# Patient Record
Sex: FEMALE | Race: WHITE
[De-identification: names, ages, dates, MRNs, and addresses within clinical notes are randomized per-mention and may not be internally consistent; named-entity substitution may affect disease eponyms.]

---

## 2017-10-27 ENCOUNTER — HOSPITAL ENCOUNTER (OUTPATIENT)
Dept: HOSPITAL 76 - LAB.R | Age: 32
Discharge: HOME | End: 2017-10-27
Attending: FAMILY MEDICINE
Payer: COMMERCIAL

## 2017-10-27 DIAGNOSIS — N89.8: Primary | ICD-10-CM

## 2017-10-27 DIAGNOSIS — R10.2: ICD-10-CM

## 2017-10-27 PROCEDURE — 87510 GARDNER VAG DNA DIR PROBE: CPT

## 2017-10-27 PROCEDURE — 87480 CANDIDA DNA DIR PROBE: CPT

## 2017-10-27 PROCEDURE — 87660 TRICHOMONAS VAGIN DIR PROBE: CPT

## 2017-10-27 PROCEDURE — 87086 URINE CULTURE/COLONY COUNT: CPT

## 2017-10-30 ENCOUNTER — HOSPITAL ENCOUNTER (OUTPATIENT)
Dept: HOSPITAL 76 - DI | Age: 32
Discharge: HOME | End: 2017-10-30
Attending: FAMILY MEDICINE
Payer: COMMERCIAL

## 2017-10-30 DIAGNOSIS — R10.2: Primary | ICD-10-CM

## 2017-10-30 DIAGNOSIS — Z97.5: ICD-10-CM

## 2017-10-30 PROCEDURE — 76830 TRANSVAGINAL US NON-OB: CPT

## 2017-10-30 PROCEDURE — 76856 US EXAM PELVIC COMPLETE: CPT

## 2017-11-01 NOTE — ULTRASOUND REPORT
PELVIC ULTRASOUND:  10/30/2017 

 

CLINICAL INDICATION:  Pelvic pain. 

 

COMPARISON:  04/21/2015  

 

TECHNIQUE:  Transabdominal pelvic ultrasound performed for global evaluation.  
Transvaginal pelvic ultrasound performed for detailed evaluation.  Real-time 
scanning performed and static images obtained. 

 

FINDINGS:  The uterus is anteverted, measuring 8.5 x 4.6 x 4.2 cm.  The 
endometrial echo complex measures 6 mm.  An IUD is noted within the endometrial 
canal.  No focal myometrial lesion is seen.  The right ovary measures 4.2 x 2.8 
x 2.3 cm.  Normal flow is seen.  The left ovary measures 2.6 x 2.0 x 1.7 cm.  
There is either a mildly dilated left fallopian tube interposed between the 
uterus and left ovary or a left parovarian cyst, which measures 2.8 x 2.7 x 1.5 
cm.  Normal flow is seen to the left ovary.  No free fluid is present. 

 

IMPRESSION:  

1.  IUD IN THE EXPECTED LOCATION.

 

2.  LEFT PAROVARIAN CYST VERSUS MILDLY DILATED FALLOPIAN TUBE.

 

3.  RESOLUTION OF PREVIOUSLY SEEN HEMORRHAGIC RIGHT OVARIAN CYST. 

 

 

 

DD:11/01/2017 9:04:50  DT: 11/01/2017 09:16  JOB #: [id number]  EXT JOB #:
[id number]

[facility]

## 2018-04-21 ENCOUNTER — HOSPITAL ENCOUNTER (OUTPATIENT)
Dept: HOSPITAL 76 - ED | Age: 33
Setting detail: OBSERVATION
LOS: 1 days | Discharge: HOME | End: 2018-04-22
Attending: INTERNAL MEDICINE | Admitting: INTERNAL MEDICINE
Payer: COMMERCIAL

## 2018-04-21 DIAGNOSIS — Z79.3: ICD-10-CM

## 2018-04-21 DIAGNOSIS — Z79.899: ICD-10-CM

## 2018-04-21 DIAGNOSIS — F32.9: ICD-10-CM

## 2018-04-21 DIAGNOSIS — J98.11: ICD-10-CM

## 2018-04-21 DIAGNOSIS — I26.99: Primary | ICD-10-CM

## 2018-04-21 LAB
ALBUMIN DIAFP-MCNC: 3.6 G/DL (ref 3.2–5.5)
ALBUMIN/GLOB SERPL: 0.8 {RATIO} (ref 1–2.2)
ALP SERPL-CCNC: 45 IU/L (ref 42–121)
ALT SERPL W P-5'-P-CCNC: < 10 IU/L (ref 10–60)
ANION GAP SERPL CALCULATED.4IONS-SCNC: 9 MMOL/L (ref 6–13)
AST SERPL W P-5'-P-CCNC: 13 IU/L (ref 10–42)
BASOPHILS NFR BLD AUTO: 0 10^3/UL (ref 0–0.1)
BASOPHILS NFR BLD AUTO: 0.3 %
BILIRUB BLD-MCNC: < 0.2 MG/DL (ref 0.2–1)
BUN SERPL-MCNC: 9 MG/DL (ref 6–20)
CALCIUM UR-MCNC: 8.7 MG/DL (ref 8.5–10.3)
CHLORIDE SERPL-SCNC: 99 MMOL/L (ref 101–111)
CLARITY UR REFRACT.AUTO: CLEAR
CO2 SERPL-SCNC: 26 MMOL/L (ref 21–32)
CREAT SERPLBLD-SCNC: 0.6 MG/DL (ref 0.4–1)
EOSINOPHIL # BLD AUTO: 0 10^3/UL (ref 0–0.7)
EOSINOPHIL NFR BLD AUTO: 0.1 %
ERYTHROCYTE [DISTWIDTH] IN BLOOD BY AUTOMATED COUNT: 14.3 % (ref 12–15)
GFRSERPLBLD MDRD-ARVRAT: 117 ML/MIN/{1.73_M2} (ref 89–?)
GLOBULIN SER-MCNC: 4.3 G/DL (ref 2.1–4.2)
GLUCOSE SERPL-MCNC: 95 MG/DL (ref 70–100)
GLUCOSE UR QL STRIP.AUTO: NEGATIVE MG/DL
HCG UR QL: NEGATIVE
HGB UR QL STRIP: 12 G/DL (ref 12–16)
INR PPP: 1.1 (ref 0.8–1.2)
KETONES UR QL STRIP.AUTO: NEGATIVE MG/DL
LYMPHOCYTES # SPEC AUTO: 1.7 10^3/UL (ref 1.5–3.5)
LYMPHOCYTES NFR BLD AUTO: 17 %
MAGNESIUM SERPL-MCNC: 2 MG/DL (ref 1.7–2.8)
MCH RBC QN AUTO: 26.3 PG (ref 27–31)
MCHC RBC AUTO-ENTMCNC: 32.1 G/DL (ref 32–36)
MCV RBC AUTO: 81.9 FL (ref 81–99)
MONOCYTES # BLD AUTO: 0.9 10^3/UL (ref 0–1)
MONOCYTES NFR BLD AUTO: 8.9 %
NEUTROPHILS # BLD AUTO: 7.4 10^3/UL (ref 1.5–6.6)
NEUTROPHILS # SNV AUTO: 10 X10^3/UL (ref 4.8–10.8)
NEUTROPHILS NFR BLD AUTO: 73.7 %
NITRITE UR QL STRIP.AUTO: NEGATIVE
PDW BLD AUTO: 7.6 FL (ref 7.9–10.8)
PH UR STRIP.AUTO: 5.5 PH (ref 5–7.5)
PHOSPHATE BLD-MCNC: 2.9 MG/DL (ref 2.5–4.6)
PLATELET # BLD: 295 10^3/UL (ref 130–450)
PROT SPEC-MCNC: 7.9 G/DL (ref 6.7–8.2)
PROT UR STRIP.AUTO-MCNC: NEGATIVE MG/DL
PROTHROM ACT/NOR PPP: 12.7 SECS (ref 9.9–12.6)
RBC # UR STRIP.AUTO: NEGATIVE /UL
RBC MAR: 4.55 10^6/UL (ref 4.2–5.4)
SODIUM SERPLBLD-SCNC: 134 MMOL/L (ref 135–145)
SP GR UR STRIP.AUTO: 1.01 (ref 1–1.03)
UROBILINOGEN UR QL STRIP.AUTO: (no result) E.U./DL
UROBILINOGEN UR STRIP.AUTO-MCNC: NEGATIVE MG/DL

## 2018-04-21 PROCEDURE — 81003 URINALYSIS AUTO W/O SCOPE: CPT

## 2018-04-21 PROCEDURE — 80053 COMPREHEN METABOLIC PANEL: CPT

## 2018-04-21 PROCEDURE — 85025 COMPLETE CBC W/AUTO DIFF WBC: CPT

## 2018-04-21 PROCEDURE — 36415 COLL VENOUS BLD VENIPUNCTURE: CPT

## 2018-04-21 PROCEDURE — 83690 ASSAY OF LIPASE: CPT

## 2018-04-21 PROCEDURE — 96372 THER/PROPH/DIAG INJ SC/IM: CPT

## 2018-04-21 PROCEDURE — 71275 CT ANGIOGRAPHY CHEST: CPT

## 2018-04-21 PROCEDURE — 76705 ECHO EXAM OF ABDOMEN: CPT

## 2018-04-21 PROCEDURE — 81001 URINALYSIS AUTO W/SCOPE: CPT

## 2018-04-21 PROCEDURE — 83735 ASSAY OF MAGNESIUM: CPT

## 2018-04-21 PROCEDURE — 96375 TX/PRO/DX INJ NEW DRUG ADDON: CPT

## 2018-04-21 PROCEDURE — 85610 PROTHROMBIN TIME: CPT

## 2018-04-21 PROCEDURE — 82150 ASSAY OF AMYLASE: CPT

## 2018-04-21 PROCEDURE — 99283 EMERGENCY DEPT VISIT LOW MDM: CPT

## 2018-04-21 PROCEDURE — 85379 FIBRIN DEGRADATION QUANT: CPT

## 2018-04-21 PROCEDURE — 93005 ELECTROCARDIOGRAM TRACING: CPT

## 2018-04-21 PROCEDURE — 81025 URINE PREGNANCY TEST: CPT

## 2018-04-21 PROCEDURE — 71046 X-RAY EXAM CHEST 2 VIEWS: CPT

## 2018-04-21 PROCEDURE — 99285 EMERGENCY DEPT VISIT HI MDM: CPT

## 2018-04-21 PROCEDURE — 99218: CPT

## 2018-04-21 PROCEDURE — 87086 URINE CULTURE/COLONY COUNT: CPT

## 2018-04-21 PROCEDURE — 96374 THER/PROPH/DIAG INJ IV PUSH: CPT

## 2018-04-21 PROCEDURE — 84100 ASSAY OF PHOSPHORUS: CPT

## 2018-04-21 RX ADMIN — OXYCODONE PRN MG: 5 TABLET ORAL at 22:02

## 2018-04-21 RX ADMIN — SODIUM CHLORIDE, PRESERVATIVE FREE SCH ML: 5 INJECTION INTRAVENOUS at 23:24

## 2018-04-21 NOTE — HISTORY & PHYSICAL EXAMINATION
Chief Complaint





- Chief Complaint


Chief Complaint: Chest pain





History of Present Illness





- Admitted From


Admitted From:: Emergency department





- History Obtained From


Records Reviewed: Yes


History obtained from: Patient


Exam Limitations: None





- History of Present Illness


HPI Comment/Other: 


Patient is a 32-year-old female with a past medical history significant for 

depression who presented to the emergency department with a chief complaint of 

right-sided chest pain.  She states that the chest pain started exactly 1 week 

ago.  She states that it started with right-sided pain when she woke up in the 

morning.  She thought that she had just slept on her side incorrectly.  However 

the pain continued for the next 2 days.  She states that the pain was much 

worse when she took a deep breath.  She states that she was not short of breath 

but because breathing hurt she did not want to take a breath.  She states that 

after 2 days the pain moved from the right side of the chest to the left side 

of the chest.  She states that the pain then continued in the left side for 2 

days and then went back to the right side.  She states that the pain is located 

all over the right chest but in around the lower rib cage.  She states that is 

worse with a deep breath.  She states it does not radiate to her left arm or 

jaw.  She states that the only thing that makes it better is just staying 

still.  She states that over the last 2 days the pain has been there all the 

time not just with deep breathing.  She states that even lying down flat was 

making the pain worse.  She was concerned that she must have infection and she 

went to urgent care today and was sent to the emergency department for a right 

upper quadrant ultrasound.  Of note the patient states that she previously used 

an IUD for birth control but that caused multiple cyst to rupture therefore she 

was switched to an oral birth control pill just 1 month ago.  She denies 

smoking. The patient denies any recent travel.  She denies any recent long car 

rides.  She denies any recent plane travel.  She denies any family history of 

DVTs or PEs.





The patient denies any fevers, chills, headaches, blurred vision, runny nose, 

sore throat, nasal congestion, difficulty swallowing, orthopnea, PND, lower 

extremity swelling, abdominal pain, nausea, vomiting, diarrhea, constipation, 

dysuria, increased urinary frequency, urinary urgency, back pain, joint pain, 

muscle aches, joint swelling, neck stiffness, recent unintentional weight loss, 

night sweats, changes in appetite, hair loss, skin rashes or any focal 

neurologic deficits





On presentation to the emergency department the patient was afebrile heart rate 

was 95 blood pressure was stable and she was not in any acute respiratory 

distress saturating well on room air.  The patient's lab work revealed sodium 

of 134 otherwise all her electrolytes were within normal limits.  The patient's 

CBC was all within normal limits and her UA was negative.  The patient 

underwent a EKG which showed normal sinus rhythm at 96.  The patient underwent 

an ultrasound of her abdomen which revealed no acute changes.  The patient then 

underwent a CT angiogram of her thorax which showed acute pulmonary embolism 

within branch vessel supplying the right greater than the left lobes without 

right heart strain.  She also had patchy consolidation within the right lung 

base which likely represented pulmonary infarction coupled with atelectasis.  

The patient continued to have severe pain and required morphine in the 

emergency department.  The patient was placed in observation overnight for 

control of her chest pain and to start treatment for pulmonary embolism.  In 

the emergency department the patient did receive Lovenox subQ.





History





- Past Medical History


Psych: reports: Depression


MRSA Hx?: No





- Past Surgical History


HEENT: reports: Tonsil/Adenoidectomy





- Family & Social History


Family History: Mother: Alive and Well, Hyperlipidemia (Maternal grandfather), 

Father: Alive and Well, Other family: Hyperlipidemia


Living arrangement: At home


Living Situation: With spouse/s.o.


Social History Notes: The patient lives in [place] with her  and 2 

children.  She has 1 biological kid and one step kids.  She is originally from 

the [facility] but not from \[facility]\"".  She works for a 

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  She is .  She does not smoke 

cigarettes, she rarely drinks alcohol and she denies any illicit drug use.





- POLST


Patient has POLST: No


POLST Status: Full Code





Meds/Allgy





- Home Medications


Home Medications: 


 Ambulatory Orders











 Medication  Instructions  Recorded  Confirmed


 


Sertraline HCl [Zoloft] 100 mg PO DAILY 04/21/15 04/21/15














- Allergies


Allergies/Adverse Reactions: 


 Allergies











Allergy/AdvReac Type Severity Reaction Status Date / Time


 


No Known Drug Allergies Allergy   Verified 04/21/15 12:02














Review of Systems





- Other Findings


Other Findings: 





A comprehensive review of systems was performed the pertinent positives and 

negatives are stated above in the HPI and the remainder of the review of 

systems is negative.





Exam





- Vital Signs


Reviewed Vital Signs: Yes


Vital Signs: 





 Vital Signs x48h











  Temp Pulse Resp BP Pulse Ox


 


 18 17:57   85  16  129/75  97


 


 18 16:10  37.2 C  95  16  134/99 H  95














- Physical Exam


General Appearance: positive: Alert, Other (Patient is tearful and very upset 

by her diagnosis)


Eyes Bilateral: positive: Normal inspection, PERRL, EOMI, No lid inflammation, 

Conjunctivae nml, No scleral icterus


ENT: positive: ENT inspection nml, Pharynx nml, No signs of dehydration.  

negative: Purulent nasal drainage, Pharyngeal erythema, Oral lesions


Neck: positive: Nml inspection, Thyroid nml, No JVD, Trachea midline.  negative

: Thyromegaly, Lymphadenopathy (R), Lymphadenopathy (L), Kernig's sign, Carotid 

bruit, Tracheal deviation


Respiratory: positive: Chest non-tender, Rhonchi (Right lower lung), Other (

Patient has pain when she takes deep breath)


Cardiovascular: positive: Regular rate & rhythm, No murmur, No gallop


Peripheral Pulses: positive: 2+


Abdomen: positive: Non-tender, No organomegaly, Nml bowel sounds, No 

distention.  negative: Guarding, Rebound


Back: positive: Nml inspection.  negative: CVA tenderness (R), CVA tenderness (L

)


Skin: positive: Color nml, No rash, Warm.  negative: Cyanosis, Diaphoresis, 

Pallor, Skin rash


Extremities: positive: Non-tender, Full ROM, Nml appearance, No pedal edema


Neurologic/Psychiatric: positive: Oriented x3, CN's nml (2-12), Motor nml, 

Sensation nml, Mood/affect nml





Conclusion/Plan





- Problem List


(1) Bilateral pulmonary embolism


Conclusion/Plan: 


Patient presented with right-sided chest pain ongoing for about the last week. 

Patient's pain originally started on the right side of her chest then moved to 

the left side and then back to the right side.  It was worse with deep 

breathing.  She began having it all the time the last 2 days.  The patient 

admits to starting birth control pills 1 month ago.  She states she is not a 

smoker.  She denied having any recent travel.  She denied any long car trips or 

plane rides.  She denied any family history of pulmonary embolisms or DVTs.  

The patient was found to have bilateral pulmonary emboli on CT angiogram of her 

chest.





Plan:


Lovenox and Coumadin


Pain control with oxycodone, Tylenol and IV morphine


Supplemental oxygen as needed


Teaching for Lovenox injections


We will check a price comparison between Coumadin and other new or 

anticoagulants for the patient in the morning








(2) Chest pain


Conclusion/Plan: 


Patient has right-sided chest pain likely secondary to pulmonary emboli.  The 

patient's CT angiogram thorax shows patchy consolidation within the right lung 

base which likely represents developing pulmonary infarction coupled with 

atelectasis which is likely cause of the pain.


Patient will get IV morphine, oral Tylenol and oxycodone as needed for pain 

control.


Patient will be treated for pulmonary emboli with Lovenox and Coumadin


Qualifiers: 


   Chest pain type: chest pain on breathing   Qualified Code(s): R07.1 - Chest 

pain on breathing; R07.81 - Pleurodynia   





(3) Depression


Conclusion/Plan: 


Continue patient on home dose of Zoloft


Stable


Qualifiers: 


   Depression Type: unspecified   Qualified Code(s): F32.9 - Major depressive 

disorder, single episode, unspecified   





- Lab Results


Lab results reviewed: Yes


Fish Bones: 


 18 16:32





 18 16:32


Other Lab Results: 








 Laboratory Results











WBC  10.0 x10^3/uL (4.8-10.8)   18  16:32    


 


RBC  4.55 10^6/uL (4.20-5.40)   18  16:32    


 


Hgb  12.0 g/dL (12.0-16.0)   18  16:32    


 


Hct  37.3 % (37.0-47.0)   18  16:32    


 


MCV  81.9 fL (81.0-99.0)   18  16:32    


 


MCH  26.3 pg (27.0-31.0)  L  18  16:32    


 


MCHC  32.1 g/dL (32.0-36.0)   18  16:32    


 


RDW  14.3 % (12.0-15.0)   18  16:32    


 


Plt Count  295 10^3/uL (130-450)   18  16:32    


 


MPV  7.6 fL (7.9-10.8)  L  18  16:32    


 


Neut #  7.4 10^3/uL (1.5-6.6)  H  18  16:32    


 


Lymph #  1.7 10^3/uL (1.5-3.5)   18  16:32    


 


Mono #  0.9 10^3/uL (0.0-1.0)   18  16:32    


 


Eos #  0.0 10^3/uL (0.0-0.7)   18  16:32    


 


Baso #  0.0 10^3/uL (0.0-0.1)   18  16:32    


 


Absolute Nucleated RBC  0.00 x10^3/uL  18  16:32    


 


Nucleated RBC %  0.0 /100WBC  18  16:32    


 


PT  12.7 secs (9.9-12.6)  H  18  16:32    


 


INR  1.1  (0.8-1.2)   18  16:32    


 


Sodium  134 mmol/L (135-145)  L  18  16:32    


 


Potassium  4.0 mmol/L (3.5-5.0)   18  16:32    


 


Chloride  99 mmol/L (101-111)  L  18  16:32    


 


Carbon Dioxide  26 mmol/L (21-32)   18  16:32    


 


Anion Gap  9.0  (6-13)   18  16:32    


 


BUN  9 mg/dL (6-20)   18  16:32    


 


Creatinine  0.6 mg/dL (0.4-1.0)   18  16:32    


 


Estimated GFR (MDRD)  117  (>89)   18  16:32    


 


Glucose  95 mg/dL ()   18  16:32    


 


Calcium  8.7 mg/dL (8.5-10.3)   18  16:32    


 


Phosphorus  2.9 mg/dL (2.5-4.6)   18  16:32    


 


Magnesium  2.0 mg/dL (1.7-2.8)   18  16:32    


 


Total Bilirubin  < 0.2 mg/dL (0.2-1.0)  L  18  16:32    


 


AST  13 IU/L (10-42)   18  16:32    


 


ALT  < 10 IU/L (10-60)  L  18  16:32    


 


Alkaline Phosphatase  45 IU/L ()   18  16:32    


 


Total Protein  7.9 g/dL (6.7-8.2)   18  16:32    


 


Albumin  3.6 g/dL (3.2-5.5)   18  16:32    


 


Globulin  4.3 g/dL (2.1-4.2)  H  18  16:32    


 


Albumin/Globulin Ratio  0.8  (1.0-2.2)  L  18  16:32    


 


Urine Color  YELLOW   18  17:02    


 


Urine Clarity  CLEAR  (CLEAR)   18  17:02    


 


Urine pH  5.5 PH (5.0-7.5)   18  17:02    


 


Ur Specific Gravity  1.010  (1.002-1.030)   18  17:02    


 


Urine Protein  NEGATIVE mg/dL (NEGATIVE)   18  17:02    


 


Urine Glucose (UA)  NEGATIVE mg/dL (NEGATIVE)   18  17:02    


 


Urine Ketones  NEGATIVE mg/dL (NEGATIVE)   18  17:02    


 


Urine Occult Blood  NEGATIVE  (NEGATIVE)   18  17:02    


 


Urine Nitrite  NEGATIVE  (NEGATIVE)   18  17:02    


 


Urine Bilirubin  NEGATIVE  (NEGATIVE)   18  17:02    


 


Urine Urobilinogen  0.2 (NORMAL) E.U./dL (NORMAL)   18  17:02    


 


Ur Leukocyte Esterase  NEGATIVE  (NEGATIVE)   18  17:02    


 


Ur Microscopic Review  NOT INDICATED   18  17:02    


 


Urine Culture Comments  NOT INDICATED   18  17:02    


 


Urine HCG, Qual  NEGATIVE   18  17:02    














- Diagnostic Imaging Results


Diagnostic Imaging Results: positive: Final report reviewed


Diagnostic Imaging Results Comments: 





Abdominal ultrasound


Impression:


Negative right upper quadrant ultrasound





CT angiogram chest/thorax


Impression:


1.  Study is positive for acute pulmonary embolism.  This thrombus within 

branch vessels supplying the right greater than the left lobes.  There is no 

evidence of right heart strain.


2.  There is patchy consolidation within the right lung base which likely 

represents developing pulmonary infarction coupled with atelectasis.


3.  There is small right pleural effusion.


4.  There is no thoracic aortic dissection.





- EKG Results


EKG Interpreted Independently: Yes


EKG Findings: 





Normal sinus rhythm





Core Measures





- Anticipated LOS


I expect patient to be DC'd or transferred within 96 hours.: Yes





- DVT/VTE - Prophylaxis


VTE/DVT Device ordered at admit?: Yes

## 2018-04-21 NOTE — ED PHYSICIAN DOCUMENTATION
History of Present Illness





- Stated complaint


Stated Complaint: UPPER BACK/CHEST PAIN





- Chief complaint


Chief Complaint: Cardiac





- History obtained from


History obtained from: Patient





- History of Present Illness


Timing: How many weeks ago (1)


Pain level max: 8


Pain level now: 8


Quality: aching, dull


Improved by: standing is more comfortable.


Worsened by: movement





- Additonal information


Additional information: 


Patient is a 32 year old female with R sided back and chest pain x 1 week. Mild 

cough. No fevers. Feels mildly dyspneic.  No trauma. No falls. Is on OCPs as 

well as zoloft. No smoking history. No travel.  Seen at a walk in clinic today 

for same, had labs and cxr performed. Sent here for RUQ US. 





Review of Systems


Ten Systems: 10 systems reviewed and negative


Constitutional: denies: Fever, Chills


Ears: denies: Ear pain


Nose: denies: Rhinorrhea / runny nose, Congestion


Throat: denies: Sore throat


: denies: Now pregnant EGA


Skin: denies: Rash


Musculoskeletal: denies: Neck pain, Back pain


Neurologic: denies: Headache





PD PAST MEDICAL HISTORY





- Past Medical History


Past Medical History: Yes


Psych: Depression





- Past Surgical History


Past Surgical History: Yes


HEENT: Tonsil/Adenoidectomy





- Present Medications


Home Medications: 


 Ambulatory Orders











 Medication  Instructions  Recorded  Confirmed


 


Sertraline HCl [Zoloft] 100 mg PO DAILY 04/21/15 04/21/15














- Allergies


Allergies/Adverse Reactions: 


 Allergies











Allergy/AdvReac Type Severity Reaction Status Date / Time


 


No Known Drug Allergies Allergy   Verified 04/21/15 12:02














- Living Situation


Living Situation: reports: With family


Living Arrangement: reports: At home





- Social History


Does the pt smoke?: No


Smoking Status: Never smoker


Does the pt drink ETOH?: No


Does the pt have substance abuse?: No





- Family History


Family history: reports: Non contributory





- Immunizations


Immunizations are current?: No





- POLST


Patient has POLST: No





PD ED PE NORMAL





- Vitals


Vital signs reviewed: Yes





- General


General: Alert and oriented X 3, No acute distress





- HEENT


HEENT: PERRL, Moist mucous membranes, Pharynx benign





- Neck


Neck: Supple, no meningeal sign





- Cardiac


Cardiac: RRR





- Respiratory


Respiratory: No respiratory distress, Clear bilaterally





- Abdomen


Abdomen: Soft, Non distended, Other (mild TTP RUQ.)





- Derm


Derm: Warm and dry





- Extremities


Extremities: No edema, No calf tenderness / cord





- Neuro


Neuro: Alert and oriented X 3





- Psych


Psych: Normal mood, Normal affect





Results





- Vitals


Vitals: 


 Vital Signs - 24 hr











  04/21/18 04/21/18





  16:10 17:57


 


Temperature 37.2 C 


 


Heart Rate 95 85


 


Respiratory 16 16





Rate  


 


Blood Pressure 134/99 H 129/75


 


O2 Saturation 95 97








 Oxygen











O2 Source                      Room air

















- EKG (time done)


  ** 1615


Rate: Rate (enter#) (96)


Rhythm: NSR


Axis: Normal


Intervals: Normal NC


QRS: Normal


Ischemia: Normal ST segments





- Labs


Labs: 


 Laboratory Tests











  04/21/18 04/21/18 04/21/18





  16:32 16:32 16:32


 


WBC  10.0  


 


RBC  4.55  


 


Hgb  12.0  


 


Hct  37.3  


 


MCV  81.9  


 


MCH  26.3 L  


 


MCHC  32.1  


 


RDW  14.3  


 


Plt Count  295  


 


MPV  7.6 L  


 


Neut #  7.4 H  


 


Lymph #  1.7  


 


Mono #  0.9  


 


Eos #  0.0  


 


Baso #  0.0  


 


Absolute Nucleated RBC  0.00  


 


Nucleated RBC %  0.0  


 


PT   12.7 H 


 


INR   1.1 


 


Sodium    134 L


 


Potassium    4.0


 


Chloride    99 L


 


Carbon Dioxide    26


 


Anion Gap    9.0


 


BUN    9


 


Creatinine    0.6


 


Estimated GFR (MDRD)    117


 


Glucose    95


 


Calcium    8.7


 


Phosphorus    2.9


 


Magnesium    2.0


 


Total Bilirubin    < 0.2 L


 


AST    13


 


ALT    < 10 L


 


Alkaline Phosphatase    45


 


Total Protein    7.9


 


Albumin    3.6


 


Globulin    4.3 H


 


Albumin/Globulin Ratio    0.8 L


 


Urine Color   


 


Urine Clarity   


 


Urine pH   


 


Ur Specific Gravity   


 


Urine Protein   


 


Urine Glucose (UA)   


 


Urine Ketones   


 


Urine Occult Blood   


 


Urine Nitrite   


 


Urine Bilirubin   


 


Urine Urobilinogen   


 


Ur Leukocyte Esterase   


 


Ur Microscopic Review   


 


Urine Culture Comments   


 


Urine HCG, Qual   














  04/21/18





  17:02


 


WBC 


 


RBC 


 


Hgb 


 


Hct 


 


MCV 


 


MCH 


 


MCHC 


 


RDW 


 


Plt Count 


 


MPV 


 


Neut # 


 


Lymph # 


 


Mono # 


 


Eos # 


 


Baso # 


 


Absolute Nucleated RBC 


 


Nucleated RBC % 


 


PT 


 


INR 


 


Sodium 


 


Potassium 


 


Chloride 


 


Carbon Dioxide 


 


Anion Gap 


 


BUN 


 


Creatinine 


 


Estimated GFR (MDRD) 


 


Glucose 


 


Calcium 


 


Phosphorus 


 


Magnesium 


 


Total Bilirubin 


 


AST 


 


ALT 


 


Alkaline Phosphatase 


 


Total Protein 


 


Albumin 


 


Globulin 


 


Albumin/Globulin Ratio 


 


Urine Color  YELLOW


 


Urine Clarity  CLEAR


 


Urine pH  5.5


 


Ur Specific Gravity  1.010


 


Urine Protein  NEGATIVE


 


Urine Glucose (UA)  NEGATIVE


 


Urine Ketones  NEGATIVE


 


Urine Occult Blood  NEGATIVE


 


Urine Nitrite  NEGATIVE


 


Urine Bilirubin  NEGATIVE


 


Urine Urobilinogen  0.2 (NORMAL)


 


Ur Leukocyte Esterase  NEGATIVE


 


Ur Microscopic Review  NOT INDICATED


 


Urine Culture Comments  NOT INDICATED


 


Urine HCG, Qual  NEGATIVE














- Rads (name of study)


  ** CTPA


Radiology: Prelim report reviewed, EMP read contemporaneously, See rad report (

Study is positive for acute pulmonary embolism. This thrombus within branch 

vessels supplying the right greater than left lower lobes. There is no evidence 

of right heart strain. 2. There is patchy consolidation within the right lung 

base which likely represents developing pulmonary infarction coupled with 

atelectasis. 3. There is a small right pleural effusion. 4. There is no 

thoracic aortic dissection. )





  ** RUQ US


Radiology: Prelim report reviewed, EMP read contemporaneously, See rad report (

Negative right upper quadrant ultrasound. )





PD MEDICAL DECISION MAKING





- ED course


Complexity details: reviewed old records, reviewed results, re-evaluated patient

, considered differential, d/w patient, d/w family


ED course: 





Patient is a 32-year-old female who presents to the emergency department with 

right-sided chest pain, pleuritic.  Also has tachycardia but no hypoxia.  Had 

normal blood work just prior to arrival.  Had a chest x-ray that revealed a 

small right-sided pleural effusion.  CT pulmonary angiogram was undertaken for 

possible PE and was found to have bilateral subsegmental pulmonary emboli.  She 

is having significant pain in the emergency department as well.  Given Toradol 

and morphine.  Also started on Lovenox after discussion with the hospitalist.  

Right upper quadrant ultrasound is negative.  No calf tenderness or cord.  

Possible related to her recent oral contraceptives?  Discussed the case with 

Dr. Sloan, hospitalist who accepts.





This document was made in part using voice recognition software. While efforts 

are made to proofread this document, sound alike and grammatical errors may 

occur.





Departure





- Departure


Disposition: ED Place in Observation


Clinical Impression: 


 Bilateral pulmonary embolism, Pulmonary infarct





Pulmonary embolism


Qualifiers:


 Pulmonary embolism type: other Chronicity: acute Acute cor pulmonale presence: 

without acute cor pulmonale Qualified Code(s): I26.99 - Other pulmonary 

embolism without acute cor pulmonale





Chest pain


Qualifiers:


 Chest pain type: chest pain on breathing Qualified Code(s): R07.1 - Chest pain 

on breathing





Condition: Stable


Discharge Date/Time: 04/21/18 19:04

## 2018-04-21 NOTE — CT REPORT
EXAM:

CT ANGIOGRAM CHEST

 

EXAM DATE: 4/21/2018 05:45 PM.

 

CLINICAL HISTORY: Chest pain

 

COMPARISON: None.

 

TECHNIQUE: Routine helical imaging was performed through the chest in the pulmonary arterial phase. I
V Contrast: 80 cc Isovue 300. Reconstructions: Coronal 3-D MIP reconstructions.Sagittal and coronal.

 

In accordance with CT protocol optimization, one or more of the following dose reduction techniques w
ere utilized for this exam: automated exposure control, adjustment of mA and/or KV based on patient s
ize, or use of iterative reconstructive technique.

 

FINDINGS: 

Pulmonary Arteries: 

Diagnostic quality: Adequate through the segmental arteries. There is pulmonary embolism within [facility]
h vessels supplying the right and left lower lobes. 

 

RV/LV is within normal limits. There is no interventricular septal bowing. There is no reflux of cont
rast material in the IVC.

 

Lungs/Pleura: There is patchy consolidation within the right lower lobe. This may represent developin
g pulmonary infarct. There is a small right pleural effusion. There is no evidence of pneumothorax.

 

Mediastinum: Normal. No cardiac enlargement or adenopathy. 

 

Thoracic Aorta: Unremarkable.

 

Upper Abdomen: Unremarkable.

 

Other: None.

 

IMPRESSION: 

1. Study is positive for acute pulmonary embolism. This thrombus within branch vessels supplying the 
right greater than left lower lobes. There is no evidence of right heart strain. 

2. There is patchy consolidation within the right lung base which likely represents developing pulmon
anusha infarction coupled with atelectasis. 

3. There is a small right pleural effusion. 

4. There is no thoracic aortic dissection.

 

[facility]

 

The above findings  were discussed with Dr Ritchie by Dr. Logan Gamble at 18:09 hrs on 4/21/18.

Referring Provider Line: [phone]

 

SITE ID: [id number]

## 2018-04-21 NOTE — ULTRASOUND REPORT
EXAM:

ABDOMEN ULTRASOUND LIMITED, RUQ

 

EXAM DATE: 4/21/2018 05:37 PM.

 

CLINICAL HISTORY: Abdominal pain

 

COMPARISON: None.

 

TECHNIQUE: Real-time scanning was performed with static images obtained. 

 

FINDINGS: 

Liver: Subcentimeter hyperechoic focus within the right hepatic lobe could represent a small meningio
ma. No suspicious hepatic abnormalities are seen. Main portal vein flow: Hepatopetal.

 

Gallbladder: No stones, wall thickening, or sonographic Byrd's sign. 

 

Biliary System: CBD measures 5 mm. No intrahepatic or extrahepatic ductal dilatation. 

 

Other:  The visualized pancreas and right kidney are unremarkable.  

 

IMPRESSION: Negative right upper quadrant ultrasound.

 

[facility] 

Referring Provider Line: [phone]

 

SITE ID: [id number]

## 2018-04-22 VITALS — SYSTOLIC BLOOD PRESSURE: 118 MMHG | DIASTOLIC BLOOD PRESSURE: 63 MMHG

## 2018-04-22 LAB
ALBUMIN DIAFP-MCNC: 3.2 G/DL (ref 3.2–5.5)
ALBUMIN/GLOB SERPL: 0.8 {RATIO} (ref 1–2.2)
ALP SERPL-CCNC: 42 IU/L (ref 42–121)
ALT SERPL W P-5'-P-CCNC: < 10 IU/L (ref 10–60)
ANION GAP SERPL CALCULATED.4IONS-SCNC: 7 MMOL/L (ref 6–13)
AST SERPL W P-5'-P-CCNC: 12 IU/L (ref 10–42)
BASOPHILS NFR BLD AUTO: 0 10^3/UL (ref 0–0.1)
BASOPHILS NFR BLD AUTO: 0.5 %
BILIRUB BLD-MCNC: 0.2 MG/DL (ref 0.2–1)
BUN SERPL-MCNC: 9 MG/DL (ref 6–20)
CALCIUM UR-MCNC: 8.3 MG/DL (ref 8.5–10.3)
CHLORIDE SERPL-SCNC: 99 MMOL/L (ref 101–111)
CO2 SERPL-SCNC: 28 MMOL/L (ref 21–32)
CREAT SERPLBLD-SCNC: 0.7 MG/DL (ref 0.4–1)
EOSINOPHIL # BLD AUTO: 0 10^3/UL (ref 0–0.7)
EOSINOPHIL NFR BLD AUTO: 0.5 %
ERYTHROCYTE [DISTWIDTH] IN BLOOD BY AUTOMATED COUNT: 14.2 % (ref 12–15)
GFRSERPLBLD MDRD-ARVRAT: 98 ML/MIN/{1.73_M2} (ref 89–?)
GLOBULIN SER-MCNC: 3.9 G/DL (ref 2.1–4.2)
GLUCOSE SERPL-MCNC: 96 MG/DL (ref 70–100)
HGB UR QL STRIP: 11.5 G/DL (ref 12–16)
INR PPP: 1.2 (ref 0.8–1.2)
LYMPHOCYTES # SPEC AUTO: 1.7 10^3/UL (ref 1.5–3.5)
LYMPHOCYTES NFR BLD AUTO: 20.5 %
MAGNESIUM SERPL-MCNC: 2.4 MG/DL (ref 1.7–2.8)
MCH RBC QN AUTO: 26.3 PG (ref 27–31)
MCHC RBC AUTO-ENTMCNC: 32.1 G/DL (ref 32–36)
MCV RBC AUTO: 81.8 FL (ref 81–99)
MONOCYTES # BLD AUTO: 0.8 10^3/UL (ref 0–1)
MONOCYTES NFR BLD AUTO: 9.7 %
NEUTROPHILS # BLD AUTO: 5.9 10^3/UL (ref 1.5–6.6)
NEUTROPHILS # SNV AUTO: 8.5 X10^3/UL (ref 4.8–10.8)
NEUTROPHILS NFR BLD AUTO: 68.8 %
PDW BLD AUTO: 7.2 FL (ref 7.9–10.8)
PHOSPHATE BLD-MCNC: 3.2 MG/DL (ref 2.5–4.6)
PLATELET # BLD: 262 10^3/UL (ref 130–450)
PROT SPEC-MCNC: 7.1 G/DL (ref 6.7–8.2)
PROTHROM ACT/NOR PPP: 13.3 SECS (ref 9.9–12.6)
RBC MAR: 4.39 10^6/UL (ref 4.2–5.4)
SODIUM SERPLBLD-SCNC: 134 MMOL/L (ref 135–145)

## 2018-04-22 RX ADMIN — SODIUM CHLORIDE, PRESERVATIVE FREE SCH ML: 5 INJECTION INTRAVENOUS at 09:21

## 2018-04-22 RX ADMIN — OXYCODONE PRN MG: 5 TABLET ORAL at 04:42

## 2018-04-22 RX ADMIN — OXYCODONE PRN MG: 5 TABLET ORAL at 09:20

## 2018-04-22 NOTE — DISCHARGE PLAN
Discharge Plan


Disposition: 01 Home, Self Care


Condition: Stable


Prescriptions: 


oxyCODONE [Roxicodone] 5 mg PO Q4HR PRN #42 tablet


 PRN Reason: Pain Or Fever > 38c (100.4f)


guaiFENesin [Mucinex] 600 mg PO BID PRN #20 tablet


 PRN Reason: Cough


Rivaroxaban [Xarelto] 15 mg PO Q12H #42 tablet


Rivaroxaban [Xarelto] 20 mg PO DAILY #90 tablet


Diet: Regular


Activity Restrictions: Activity as Tolerated


Shower Restrictions: No


Driving Restrictions: No


Weight Bearing: Full Weight


Instruction Topics:  Embolism Pulmonary, Anticoagulants


Additional Instructions or Follow Up instructions: 


You presented to the emergency department with right-sided chest pain.  You 

were found to have bilateral pulmonary emboli.  You were started on treatment 

here in the hospital overnight and appeared to be stable.  You will need to 

continue on treatment for pulmonary emboli for at least 3 months and up to 6 

months.  It appears most likely that you had the pulmonary emboli due to your 

use of birth control pills and we recommend that you stop taking them.  You are 

being prescribed Xarelto which initially will take 15 mg twice a day for 21 

days then take 20 mg daily for 3-6 months.  I have also prescribed you 

oxycodone that you can take as needed for your chest pain and Mucinex for 

cough.  Please follow-up with your primary care physician for further 

management.


No Smoking: If you smoke, Please STOP!  Call [phone] for help.


Follow-up with: 


Sebastian Romo DO [Primary Care Provider] -

## 2018-04-22 NOTE — DISCHARGE SUMMARY
Discharge Summary


Admit Date: 04/21/18


Discharge Date: 04/22/18


Discharging Provider: Nathanael Sloan MD


Primary Care Provider: Anastacia Romo MD


Code Status: Attempt Resuscitation


Condition at Discharge: Fair


Discharge Disposition: 01 Home, Self Care





- DIAGNOSES


Admission Diagnoses: 





1.  Bilateral pulmonary embolism


2.  Chest pain


3.  Depression


Discharge Diagnoses with Status of Each Condition: 





1.  Bilateral pulmonary embolism: Stable


2.  Chest pain: Stable


3.  Depression: Stable





- HPI


History of Present Illness: 





Patient is a 32-year-old female with a past medical history significant for 

depression who presented to the emergency department with a chief complaint of 

right-sided chest pain.  She states that the chest pain started exactly 1 week 

ago.  She states that it started with right-sided pain when she woke up in the 

morning.  She thought that she had just slept on her side incorrectly.  However 

the pain continued for the next 2 days.  She states that the pain was much 

worse when she took a deep breath.  She states that she was not short of breath 

but because breathing hurt she did not want to take a breath.  She states that 

after 2 days the pain moved from the right side of the chest to the left side 

of the chest.  She states that the pain then continued in the left side for 2 

days and then went back to the right side.  She states that the pain is located 

all over the right chest but in around the lower rib cage.  She states that is 

worse with a deep breath.  She states it does not radiate to her left arm or 

jaw.  She states that the only thing that makes it better is just staying 

still.  She states that over the last 2 days the pain has been there all the 

time not just with deep breathing.  She states that even lying down flat was 

making the pain worse.  She was concerned that she must have infection and she 

went to urgent care today and was sent to the emergency department for a right 

upper quadrant ultrasound.  Of note the patient states that she previously used 

an IUD for birth control but that caused multiple cyst to rupture therefore she 

was switched to an oral birth control pill just 1 month ago.  She denies 

smoking. The patient denies any recent travel.  She denies any recent long car 

rides.  She denies any recent plane travel.  She denies any family history of 

DVTs or PEs.





The patient denies any fevers, chills, headaches, blurred vision, runny nose, 

sore throat, nasal congestion, difficulty swallowing, orthopnea, PND, lower 

extremity swelling, abdominal pain, nausea, vomiting, diarrhea, constipation, 

dysuria, increased urinary frequency, urinary urgency, back pain, joint pain, 

muscle aches, joint swelling, neck stiffness, recent unintentional weight loss, 

night sweats, changes in appetite, hair loss, skin rashes or any focal 

neurologic deficits





On presentation to the emergency department the patient was afebrile heart rate 

was 95 blood pressure was stable and she was not in any acute respiratory 

distress saturating well on room air.  The patient's lab work revealed sodium 

of 134 otherwise all her electrolytes were within normal limits.  The patient's 

CBC was all within normal limits and her UA was negative.  The patient 

underwent a EKG which showed normal sinus rhythm at 96.  The patient underwent 

an ultrasound of her abdomen which revealed no acute changes.  The patient then 

underwent a CT angiogram of her thorax which showed acute pulmonary embolism 

within branch vessel supplying the right greater than the left lobes without 

right heart strain.  She also had patchy consolidation within the right lung 

base which likely represented pulmonary infarction coupled with atelectasis.  

The patient continued to have severe pain and required morphine in the 

emergency department.  The patient was placed in observation overnight for 

control of her chest pain and to start treatment for pulmonary embolism.  In 

the emergency department the patient did receive Lovenox subQ.





- HOSPITAL COURSE


Hospital Course: 





The patient was hospitalized and observed overnight.  She did not have any 

increasing shortness of air or hypoxia.  The patient's chest pain was 

controlled with oxycodone.  The patient was discharged home on Xarelto 15 mg 

twice daily for 21 days then 20 mg daily.  The patient will follow up with her 

primary care physician.  It appears that the patient's bilateral pulmonary 

emboli were due to her use of birth control pills.  We instructed the patient 

to stop using birth control pills.  She will need 3-6 months of treatment for 

her bilateral pulmonary emboli.  I would recommend longer duration given the 

severity of her clots.  The patient also did have some hemoptysis which was 

very mild but likely related to her clots.  Patient was discharged home in 

stable condition.  She was also prescribed oxycodone as needed for pain and 

Mucinex for coughing.





- ALLERGIES


Allergies/Adverse Reactions: 


 Allergies











Allergy/AdvReac Type Severity Reaction Status Date / Time


 


No Known Drug Allergies Allergy   Verified 04/21/15 12:02














- MEDICATIONS


Home Medications: 


 Ambulatory Orders











 Medication  Instructions  Recorded  Confirmed


 


Sertraline HCl [Zoloft] 100 mg PO DAILY 04/21/15 04/22/18


 


Rivaroxaban [Xarelto] 15 mg PO Q12H #42 tablet 04/22/18 


 


Rivaroxaban [Xarelto] 20 mg PO DAILY #90 tablet 04/22/18 


 


guaiFENesin [Mucinex] 600 mg PO BID PRN #20 tablet 04/22/18 


 


oxyCODONE [Roxicodone] 5 mg PO Q4HR PRN #42 tablet 04/22/18 














- PHYSICAL EXAM AT DISCHARGE


General Appearance: positive: No acute distress, Alert


Eyes Bilateral: positive: Normal inspection, PERRL, EOMI, No lid inflammation, 

Conjunctivae nml, No scleral icterus


ENT: positive: ENT inspection nml, Pharynx nml, No signs of dehydration.  

negative: Purulent nasal drainage, Pharyngeal erythema, Oral lesions


Neck: positive: Nml inspection, Thyroid nml, No JVD, Trachea midline.  negative

: Thyromegaly, Lymphadenopathy (R), Lymphadenopathy (L), Stiff neck, Carotid 

bruit, Tracheal deviation


Respiratory: positive: Chest non-tender, Rhonchi (Right side mostly)


Cardiovascular: positive: Regular rate & rhythm, No murmur, No gallop


Peripheral Pulses: positive: 2+


Abdomen: positive: Non-tender, No organomegaly, Nml bowel sounds, No distention


Back: positive: Nml inspection.  negative: CVA tenderness (R), CVA tenderness (L

)


Skin: positive: Color nml, No rash, Warm.  negative: Diaphoresis, Pallor, Skin 

rash


Extremities: positive: Non-tender, Full ROM, Nml appearance, No pedal edema


Neurologic/Psychiatric: positive: Oriented x3, CN's nml (2-12), Motor nml, 

Sensation nml, Mood/affect nml





- LABS


Result Diagrams: 


 04/22/18 06:25





 04/22/18 06:25


Other Lab Results: 








 Laboratory Results











WBC  8.5 x10^3/uL (4.8-10.8)   04/22/18  06:25    


 


RBC  4.39 10^6/uL (4.20-5.40)   04/22/18  06:25    


 


Hgb  11.5 g/dL (12.0-16.0)  L  04/22/18  06:25    


 


Hct  35.9 % (37.0-47.0)  L  04/22/18  06:25    


 


MCV  81.8 fL (81.0-99.0)   04/22/18  06:25    


 


MCH  26.3 pg (27.0-31.0)  L  04/22/18  06:25    


 


MCHC  32.1 g/dL (32.0-36.0)   04/22/18  06:25    


 


RDW  14.2 % (12.0-15.0)   04/22/18  06:25    


 


Plt Count  262 10^3/uL (130-450)   04/22/18  06:25    


 


MPV  7.2 fL (7.9-10.8)  L  04/22/18  06:25    


 


Neut #  5.9 10^3/uL (1.5-6.6)   04/22/18  06:25    


 


Lymph #  1.7 10^3/uL (1.5-3.5)   04/22/18  06:25    


 


Mono #  0.8 10^3/uL (0.0-1.0)   04/22/18  06:25    


 


Eos #  0.0 10^3/uL (0.0-0.7)   04/22/18  06:25    


 


Baso #  0.0 10^3/uL (0.0-0.1)   04/22/18  06:25    


 


Absolute Nucleated RBC  0.00 x10^3/uL  04/22/18  06:25    


 


Nucleated RBC %  0.0 /100WBC  04/22/18  06:25    


 


PT  13.3 secs (9.9-12.6)  H  04/22/18  06:25    


 


INR  1.2  (0.8-1.2)   04/22/18  06:25    


 


Sodium  134 mmol/L (135-145)  L  04/22/18  06:25    


 


Potassium  3.6 mmol/L (3.5-5.0)   04/22/18  06:25    


 


Chloride  99 mmol/L (101-111)  L  04/22/18  06:25    


 


Carbon Dioxide  28 mmol/L (21-32)   04/22/18  06:25    


 


Anion Gap  7.0  (6-13)   04/22/18  06:25    


 


BUN  9 mg/dL (6-20)   04/22/18  06:25    


 


Creatinine  0.7 mg/dL (0.4-1.0)   04/22/18  06:25    


 


Estimated GFR (MDRD)  98  (>89)   04/22/18  06:25    


 


Glucose  96 mg/dL ()   04/22/18  06:25    


 


Calcium  8.3 mg/dL (8.5-10.3)  L  04/22/18  06:25    


 


Phosphorus  3.2 mg/dL (2.5-4.6)   04/22/18  06:25    


 


Magnesium  2.4 mg/dL (1.7-2.8)   04/22/18  06:25    


 


Total Bilirubin  0.2 mg/dL (0.2-1.0)   04/22/18  06:25    


 


AST  12 IU/L (10-42)   04/22/18  06:25    


 


ALT  < 10 IU/L (10-60)  L  04/22/18  06:25    


 


Alkaline Phosphatase  42 IU/L ()   04/22/18  06:25    


 


Total Protein  7.1 g/dL (6.7-8.2)   04/22/18  06:25    


 


Albumin  3.2 g/dL (3.2-5.5)   04/22/18  06:25    


 


Globulin  3.9 g/dL (2.1-4.2)   04/22/18  06:25    


 


Albumin/Globulin Ratio  0.8  (1.0-2.2)  L  04/22/18  06:25    


 


Urine Color  YELLOW   04/21/18  17:02    


 


Urine Clarity  CLEAR  (CLEAR)   04/21/18  17:02    


 


Urine pH  5.5 PH (5.0-7.5)   04/21/18  17:02    


 


Ur Specific Gravity  1.010  (1.002-1.030)   04/21/18  17:02    


 


Urine Protein  NEGATIVE mg/dL (NEGATIVE)   04/21/18  17:02    


 


Urine Glucose (UA)  NEGATIVE mg/dL (NEGATIVE)   04/21/18  17:02    


 


Urine Ketones  NEGATIVE mg/dL (NEGATIVE)   04/21/18  17:02    


 


Urine Occult Blood  NEGATIVE  (NEGATIVE)   04/21/18  17:02    


 


Urine Nitrite  NEGATIVE  (NEGATIVE)   04/21/18  17:02    


 


Urine Bilirubin  NEGATIVE  (NEGATIVE)   04/21/18  17:02    


 


Urine Urobilinogen  0.2 (NORMAL) E.U./dL (NORMAL)   04/21/18  17:02    


 


Ur Leukocyte Esterase  NEGATIVE  (NEGATIVE)   04/21/18  17:02    


 


Ur Microscopic Review  NOT INDICATED   04/21/18  17:02    


 


Urine Culture Comments  NOT INDICATED   04/21/18  17:02    


 


Urine HCG, Qual  NEGATIVE   04/21/18  17:02    














- DIAGNOSTIC IMAGING


Diagnostic Imaging Results: Final report reviewed


Diagnostic Imaging Results Comments: 





Abdominal ultrasound


Impression:


Negative right upper quadrant ultrasound





CT angiogram chest/thorax


Impression:


1.  Study is positive for acute pulmonary embolism.  This thrombus within 

branch vessels supplying the right greater than the left lobes.  There is no 

evidence of right heart strain.


2.  There is patchy consolidation within the right lung base which likely 

represents developing pulmonary infarction coupled with atelectasis.


3.  There is small right pleural effusion.


4.  There is no thoracic aortic dissection.





- FOLLOW UP


Follow Up: 





Patient was prescribed Xarelto which she will take for the next 3-6 months or 

bilateral pulmonary emboli.  The patient was also given oxycodone for pain 

which should last her for the next week or 2 as she was having severe chest 

pain from the PEs.  The patient was given Mucinex for coughing.  The patient 

will follow up with her primary care physician and may need further 

hypercoagulable workup.





- TIME SPENT


Time Spent in Discharge (Minutes): 35

## 2018-07-08 ENCOUNTER — HOSPITAL ENCOUNTER (INPATIENT)
Dept: HOSPITAL 76 - ED | Age: 33
LOS: 2 days | Discharge: HOME | DRG: 811 | End: 2018-07-10
Attending: INTERNAL MEDICINE | Admitting: INTERNAL MEDICINE
Payer: COMMERCIAL

## 2018-07-08 DIAGNOSIS — D68.32: ICD-10-CM

## 2018-07-08 DIAGNOSIS — T45.515A: ICD-10-CM

## 2018-07-08 DIAGNOSIS — Z79.899: ICD-10-CM

## 2018-07-08 DIAGNOSIS — E86.1: ICD-10-CM

## 2018-07-08 DIAGNOSIS — D62: Primary | ICD-10-CM

## 2018-07-08 DIAGNOSIS — F32.9: ICD-10-CM

## 2018-07-08 DIAGNOSIS — N92.0: ICD-10-CM
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
## 2018-07-08 DIAGNOSIS — Z87.42: ICD-10-CM

## 2018-07-08 DIAGNOSIS — R55: ICD-10-CM

## 2018-07-08 DIAGNOSIS — I26.99: ICD-10-CM

## 2018-07-08 DIAGNOSIS — T38.4X5D: ICD-10-CM

## 2018-07-08 DIAGNOSIS — Z82.49: ICD-10-CM

## 2018-07-08 PROCEDURE — 84702 CHORIONIC GONADOTROPIN TEST: CPT

## 2018-07-08 PROCEDURE — 82040 ASSAY OF SERUM ALBUMIN: CPT

## 2018-07-08 PROCEDURE — 36415 COLL VENOUS BLD VENIPUNCTURE: CPT

## 2018-07-08 PROCEDURE — 85730 THROMBOPLASTIN TIME PARTIAL: CPT

## 2018-07-08 PROCEDURE — 93306 TTE W/DOPPLER COMPLETE: CPT

## 2018-07-08 PROCEDURE — 36430 TRANSFUSION BLD/BLD COMPNT: CPT

## 2018-07-08 PROCEDURE — 85025 COMPLETE CBC W/AUTO DIFF WBC: CPT

## 2018-07-08 PROCEDURE — 93005 ELECTROCARDIOGRAM TRACING: CPT

## 2018-07-08 PROCEDURE — 83735 ASSAY OF MAGNESIUM: CPT

## 2018-07-08 PROCEDURE — 84100 ASSAY OF PHOSPHORUS: CPT

## 2018-07-08 PROCEDURE — 85014 HEMATOCRIT: CPT

## 2018-07-08 PROCEDURE — 81001 URINALYSIS AUTO W/SCOPE: CPT

## 2018-07-08 PROCEDURE — 87150 DNA/RNA AMPLIFIED PROBE: CPT

## 2018-07-08 PROCEDURE — 85610 PROTHROMBIN TIME: CPT

## 2018-07-08 PROCEDURE — 99285 EMERGENCY DEPT VISIT HI MDM: CPT

## 2018-07-08 PROCEDURE — 96361 HYDRATE IV INFUSION ADD-ON: CPT

## 2018-07-08 PROCEDURE — 81025 URINE PREGNANCY TEST: CPT

## 2018-07-08 PROCEDURE — 80048 BASIC METABOLIC PNL TOTAL CA: CPT

## 2018-07-08 PROCEDURE — 86901 BLOOD TYPING SEROLOGIC RH(D): CPT

## 2018-07-08 PROCEDURE — 83690 ASSAY OF LIPASE: CPT

## 2018-07-08 PROCEDURE — 96360 HYDRATION IV INFUSION INIT: CPT

## 2018-07-08 PROCEDURE — 86900 BLOOD TYPING SEROLOGIC ABO: CPT

## 2018-07-08 PROCEDURE — 76856 US EXAM PELVIC COMPLETE: CPT

## 2018-07-08 PROCEDURE — 86850 RBC ANTIBODY SCREEN: CPT

## 2018-07-08 PROCEDURE — 86920 COMPATIBILITY TEST SPIN: CPT

## 2018-07-08 PROCEDURE — 87086 URINE CULTURE/COLONY COUNT: CPT

## 2018-07-08 PROCEDURE — 80053 COMPREHEN METABOLIC PANEL: CPT

## 2018-07-08 PROCEDURE — 81003 URINALYSIS AUTO W/O SCOPE: CPT

## 2018-07-08 PROCEDURE — 85018 HEMOGLOBIN: CPT

## 2018-07-09 LAB
ALBUMIN DIAFP-MCNC: 3.7 G/DL (ref 3.2–5.5)
ALBUMIN/GLOB SERPL: 1.3 {RATIO} (ref 1–2.2)
ALP SERPL-CCNC: 47 IU/L (ref 42–121)
ALT SERPL W P-5'-P-CCNC: 18 IU/L (ref 10–60)
ANION GAP SERPL CALCULATED.4IONS-SCNC: 4 MMOL/L (ref 6–13)
ANION GAP SERPL CALCULATED.4IONS-SCNC: 5 MMOL/L (ref 6–13)
AST SERPL W P-5'-P-CCNC: 19 IU/L (ref 10–42)
BASOPHILS NFR BLD AUTO: 0 10^3/UL (ref 0–0.1)
BASOPHILS NFR BLD AUTO: 0.5 %
BASOPHILS NFR BLD AUTO: 0.6 %
BASOPHILS NFR BLD AUTO: 0.6 %
BILIRUB BLD-MCNC: 0.3 MG/DL (ref 0.2–1)
BUN SERPL-MCNC: 11 MG/DL (ref 6–20)
BUN SERPL-MCNC: 8 MG/DL (ref 6–20)
CALCIUM UR-MCNC: 8 MG/DL (ref 8.5–10.3)
CALCIUM UR-MCNC: 8.6 MG/DL (ref 8.5–10.3)
CHLORIDE SERPL-SCNC: 103 MMOL/L (ref 101–111)
CHLORIDE SERPL-SCNC: 110 MMOL/L (ref 101–111)
CLARITY UR REFRACT.AUTO: (no result)
CO2 SERPL-SCNC: 24 MMOL/L (ref 21–32)
CO2 SERPL-SCNC: 26 MMOL/L (ref 21–32)
CREAT SERPLBLD-SCNC: 0.6 MG/DL (ref 0.4–1)
CREAT SERPLBLD-SCNC: 0.7 MG/DL (ref 0.4–1)
EOSINOPHIL # BLD AUTO: 0 10^3/UL (ref 0–0.7)
EOSINOPHIL # BLD AUTO: 0.1 10^3/UL (ref 0–0.7)
EOSINOPHIL # BLD AUTO: 0.1 10^3/UL (ref 0–0.7)
EOSINOPHIL NFR BLD AUTO: 0.3 %
EOSINOPHIL NFR BLD AUTO: 0.8 %
EOSINOPHIL NFR BLD AUTO: 0.9 %
ERYTHROCYTE [DISTWIDTH] IN BLOOD BY AUTOMATED COUNT: 15.6 % (ref 12–15)
ERYTHROCYTE [DISTWIDTH] IN BLOOD BY AUTOMATED COUNT: 15.9 % (ref 12–15)
ERYTHROCYTE [DISTWIDTH] IN BLOOD BY AUTOMATED COUNT: 16.6 % (ref 12–15)
GFRSERPLBLD MDRD-ARVRAT: 116 ML/MIN/{1.73_M2} (ref 89–?)
GFRSERPLBLD MDRD-ARVRAT: 97 ML/MIN/{1.73_M2} (ref 89–?)
GLOBULIN SER-MCNC: 2.9 G/DL (ref 2.1–4.2)
GLUCOSE SERPL-MCNC: 121 MG/DL (ref 70–100)
GLUCOSE SERPL-MCNC: 136 MG/DL (ref 70–100)
GLUCOSE UR QL STRIP.AUTO: 250 MG/DL
HCG UR QL: (no result)
HGB UR QL STRIP: 7.1 G/DL (ref 12–16)
HGB UR QL STRIP: 7.3 G/DL (ref 12–16)
HGB UR QL STRIP: 9.4 G/DL (ref 12–16)
HGB UR QL STRIP: 9.4 G/DL (ref 12–16)
INR PPP: 1.6 (ref 0.8–1.2)
KETONES UR QL STRIP.AUTO: 40 MG/DL
LIPASE SERPL-CCNC: 34 U/L (ref 22–51)
LYMPHOCYTES # SPEC AUTO: 1.8 10^3/UL (ref 1.5–3.5)
LYMPHOCYTES # SPEC AUTO: 2.3 10^3/UL (ref 1.5–3.5)
LYMPHOCYTES # SPEC AUTO: 2.6 10^3/UL (ref 1.5–3.5)
LYMPHOCYTES NFR BLD AUTO: 24.5 %
LYMPHOCYTES NFR BLD AUTO: 31.5 %
LYMPHOCYTES NFR BLD AUTO: 38.1 %
MAGNESIUM SERPL-MCNC: 1.8 MG/DL (ref 1.7–2.8)
MCH RBC QN AUTO: 24.8 PG (ref 27–31)
MCH RBC QN AUTO: 26.2 PG (ref 27–31)
MCH RBC QN AUTO: 28.1 PG (ref 27–31)
MCHC RBC AUTO-ENTMCNC: 32.2 G/DL (ref 32–36)
MCHC RBC AUTO-ENTMCNC: 32.7 G/DL (ref 32–36)
MCHC RBC AUTO-ENTMCNC: 33.3 G/DL (ref 32–36)
MCV RBC AUTO: 77.1 FL (ref 81–99)
MCV RBC AUTO: 80.1 FL (ref 81–99)
MCV RBC AUTO: 84.3 FL (ref 81–99)
MONOCYTES # BLD AUTO: 0.4 10^3/UL (ref 0–1)
MONOCYTES # BLD AUTO: 0.5 10^3/UL (ref 0–1)
MONOCYTES # BLD AUTO: 0.5 10^3/UL (ref 0–1)
MONOCYTES NFR BLD AUTO: 5 %
MONOCYTES NFR BLD AUTO: 7.1 %
MONOCYTES NFR BLD AUTO: 7.2 %
NEUTROPHILS # BLD AUTO: 3.7 10^3/UL (ref 1.5–6.6)
NEUTROPHILS # BLD AUTO: 4.3 10^3/UL (ref 1.5–6.6)
NEUTROPHILS # BLD AUTO: 5.2 10^3/UL (ref 1.5–6.6)
NEUTROPHILS # SNV AUTO: 6.9 X10^3/UL (ref 4.8–10.8)
NEUTROPHILS # SNV AUTO: 7.2 X10^3/UL (ref 4.8–10.8)
NEUTROPHILS # SNV AUTO: 7.5 X10^3/UL (ref 4.8–10.8)
NEUTROPHILS NFR BLD AUTO: 53.3 %
NEUTROPHILS NFR BLD AUTO: 60 %
NEUTROPHILS NFR BLD AUTO: 69.6 %
NITRITE UR QL STRIP.AUTO: (no result)
PDW BLD AUTO: 7.5 FL (ref 7.9–10.8)
PDW BLD AUTO: 7.6 FL (ref 7.9–10.8)
PDW BLD AUTO: 7.7 FL (ref 7.9–10.8)
PH UR STRIP.AUTO: 7 PH (ref 5–7.5)
PHOSPHATE BLD-MCNC: 2.8 MG/DL (ref 2.5–4.6)
PLATELET # BLD: 225 10^3/UL (ref 130–450)
PLATELET # BLD: 243 10^3/UL (ref 130–450)
PLATELET # BLD: 316 10^3/UL (ref 130–450)
PROT SPEC-MCNC: 6.6 G/DL (ref 6.7–8.2)
PROT UR STRIP.AUTO-MCNC: (no result) MG/DL
PROTHROM ACT/NOR PPP: 17.8 SECS (ref 9.9–12.6)
RBC # UR STRIP.AUTO: (no result) /UL
RBC # URNS HPF: (no result) /HPF (ref 0–5)
RBC MAR: 2.79 10^6/UL (ref 4.2–5.4)
RBC MAR: 2.84 10^6/UL (ref 4.2–5.4)
RBC MAR: 3.34 10^6/UL (ref 4.2–5.4)
SODIUM SERPLBLD-SCNC: 134 MMOL/L (ref 135–145)
SODIUM SERPLBLD-SCNC: 138 MMOL/L (ref 135–145)
SP GR UR STRIP.AUTO: 1.02 (ref 1–1.03)
SQUAMOUS URNS QL MICRO: (no result)
UROBILINOGEN UR QL STRIP.AUTO: (no result) E.U./DL
UROBILINOGEN UR STRIP.AUTO-MCNC: (no result) MG/DL

## 2018-07-09 PROCEDURE — 30233N1 TRANSFUSION OF NONAUTOLOGOUS RED BLOOD CELLS INTO PERIPHERAL VEIN, PERCUTANEOUS APPROACH: ICD-10-PCS | Performed by: INTERNAL MEDICINE

## 2018-07-09 RX ADMIN — SERTRALINE HYDROCHLORIDE SCH MG: 50 TABLET ORAL at 09:03

## 2018-07-09 RX ADMIN — FAMOTIDINE SCH MG: 20 TABLET, FILM COATED ORAL at 09:03

## 2018-07-09 RX ADMIN — DEXTROSE AND SODIUM CHLORIDE SCH MLS/HR: 5; 900 INJECTION, SOLUTION INTRAVENOUS at 03:37

## 2018-07-09 RX ADMIN — SODIUM CHLORIDE, PRESERVATIVE FREE SCH: 5 INJECTION INTRAVENOUS at 07:37

## 2018-07-09 RX ADMIN — DEXTROSE AND SODIUM CHLORIDE SCH MLS/HR: 5; 900 INJECTION, SOLUTION INTRAVENOUS at 23:03

## 2018-07-09 RX ADMIN — DEXTROSE AND SODIUM CHLORIDE SCH MLS/HR: 5; 900 INJECTION, SOLUTION INTRAVENOUS at 13:22

## 2018-07-09 RX ADMIN — FERROUS GLUCONATE SCH MG: 324 TABLET ORAL at 20:43

## 2018-07-09 RX ADMIN — SODIUM CHLORIDE, PRESERVATIVE FREE SCH ML: 5 INJECTION INTRAVENOUS at 17:00

## 2018-07-09 RX ADMIN — SODIUM CHLORIDE, PRESERVATIVE FREE SCH ML: 5 INJECTION INTRAVENOUS at 20:44

## 2018-07-09 RX ADMIN — FERROUS GLUCONATE SCH MG: 324 TABLET ORAL at 09:03

## 2018-07-09 NOTE — CONSULTATION NOTE
DATE OF SERVICE: 2018

Physician: Kristopher Parker MD

 

PATIENT IDENTIFICATION:  Patient is a 33-year-old  female whose last 
menstrual period 

was 2018.

 

CHIEF COMPLAINT:  Vaginal bleeding.

 

HISTORY OF PRESENT ILLNESS:  Patient states that she developed a period which 
started 

on roughly 2018.  It became progressively worse on 2018.  Saturday, 
 

she was needing to change a pad every 20-30 minutes.  At home, she became 
lightheaded, 

passed out, as well as developed palpitations.  



She presented to the emergency room, at which time her hemoglobin was noted to 
be 7 grams.  

She had been taking Xarelto for DVT with pulmonary embolism, which she started 
back in April. 

She was placed on this because she devloped this when placed on birth control 
pills.  

She denies any prior history of any embolisms or clotting disorders.  She has 
used a Mirena 

prior to that for contraception.  She was changed to birth control pills 
because of recurrent 

ovarian cysts by her provider.  She is concerned about recurrence, and 

is concerned about having future children because of her increased risk of DVT 
with PE.  



She gives no family history of clotting coagulopathies; however, she did have a 
half uncle 

who  from a myocardial infarction in his late 30s.  He had 
hypercholesterolemia.

 

PAST MEDICAL HISTORY:  Deep venous thrombosis with pulmonary embolism.  She has 
also

had recurrent ovarian cysts.

 

PAST SURGICAL HISTORY:  Tonsillectomy and adenoidectomy, as well as a LEEP 
procedure.

 

ALLERGIES:  NONE KNOWN.

 

CURRENT MEDICATIONS 

1.  Xarelto 20 mg daily, which has been placed on hold since she presented to 
the ED.  

2.  She is also on Zoloft 100 mg daily.

 

HABITS:  The patient denies use of alcohol, tobacco, street or addictive drugs, 
or 

tetrahydrocannabinol.

 

SOCIAL HISTORY:  The patient is  and lives with her spouse, child, as 
well as a 

stepchild.  She works as an .

 

FAMILY HISTORY:  Positive for hypercholesterolemia in a mother, a maternal 
grandfather, 

as well as a half uncle.  She denies any history of any GYN cancers such as 
endometrial, 

ovarian, or endometrial cancer.

 

REVIEW OF SYSTEMS:  Negative for HEENT, cardiac, pulmonary, GI, musculoskeletal
, or neurologic.

 

PHYSICAL EXAMINATION

GENERAL:  Well-developed, well-nourished female.  She is currently lying in 
bed.  She appears 

somewhat pale in her color.  She is currently blood transfusing at this time.

HEENT:  Pupils are equal, round.  Extraocular muscles are intact.  There is no 
evidence of 

any scleral icterus.  Mouth is clear.  Thyroid is not palpably enlarged.

HEART:  Regular rate and rhythm without murmurs.

LUNGS:  Lung fields are clear without rales or wheezes.

BACK:  No spinal or CVA tenderness noted.

ABDOMEN:  Soft, nontender without evidence of any organomegaly.

EXTREMITIES:  There is no calf tenderness.  There is negative Homans' sign.  



STUDIES:  Ultrasound is reviewed with the radiologist, and there is a normal 

uterus without evidence of any fibroids or endometrial polyps.  There is clot 
inside the 

uterus, which is compatible with her current bleeding state.  The patient 
states her bleeding

is decreasing with time.

 

IMPRESSION:  patient has a history of a coagulopathy with pulmonary embolism 
with deep 

venous thrombosis probably secondary to OCP's which she was taking for 
ovulation suppression. Recurrent ovarian cysts.  Her menorrhagia is secondary 
to Xarelto.  She has been off this for almost 12 hours at this time.  

She states her bleeding is improving.

 

PLAN:  Discussed the patient's various options.  These range from just to 
holding her Xarelto 

until her bleeding stops and seeing how she does through the next 3 cycles.  
She plans to  

use her Xarelto for a total of 6 months, and then discontinue it.  The 
alternative of putting a 

Mirena IUD back in with the knowledge that she will most likely have recurrent 
ovarian cysts 

have also been reviewed. an other option would be Nexplanon or DMPA.  
Performing an endometrial

 ablation with tubal ligation would be an alternative; however, this 

would preclude any further children in the future.  D and C would not be 
recommended at this 

time, as her bleeding appears to be resolving, and this could potentially 
aggravate more 

bleeding.  At this point, we will re-discuss the options with the patient 
tomorrow morning.  

If she so desires, could potentially proceed with surgical options on Wednesday
; however, at

this point, I am not totally convinced that this is the appropriate course of 
action.

 

 

DD: 2018 12:43

TD: 2018 12:54

Job #: [id number]

[email or web address]

## 2018-07-09 NOTE — HISTORY & PHYSICAL EXAMINATION
DATE OF SERVICE: 07/09/2018

Physician: Sara Bauer MD

 

HISTORY OF PRESENT ILLNESS:  This is a 33-year-old white female with history of 
depression on 

Zoloft and a history of ovarian cyst, for which she was on birth control pills.
  She apparently

developed bilateral pulmonary emboli that were felt to be from the birth 
control pills in April

of this year.  She required a stay under observation for control of pleuritic 
chest pain and 

initiation of Xarelto.  Her birth control pill was discontinued.  Since that 
time, she has had 

two menstrual periods.  The one a month ago lasted eight days and was heavy.  
She did see her 

primary care provider within these last three months and states that a 
hemoglobin was done and 

was "borderline anemic." She was started on iron tablets, which she has only 
taken for the last

two days.  In this month, she started her period 07/03/2018, which has been 
very heavy.  This 

was especially so in the last 2-3 days where she has required changing her 
tampon approximately

40 times a day and it is very saturated.  Yesterday, she felt lightheaded and 
had tunnel vision

briefly and knew to sit down and lie down with this.  Last night while sitting 
on the toilet to

change her tampon, she also felt dizzy and this quickly became a syncopal 
episode.  She fell 

forward and hit her left forehead against a wall and then slumped to the ground 
forward.  The 

 heard her fall and came to the bathroom.  When she did not answer a 
knock, he found her

on the floor, already awakening and crying.  She was able to be assisted 
upright and sat down 

while the  got ready to take her to the emergency room, which he did.  
In the emergency 

room, she states that she was lightheaded again when being upright and going to 
a bedside 

commode.  There have been no further episodes of syncope.  She has a history of 
syncope when 

she "sees blood and guts."  That had happened approximately four times in her 
life.

 

PAST MEDICAL HISTORY

   1. Depression, on Zoloft.

   2. Ovarian cysts.

   3. Pulmonary embolism diagnosed in April 2018, bilateral, started on Xarelto 
and birth 

      control pills were stopped.

   4. Anemia with recent prescription of oral iron replacement.

 

ALLERGIES:  NONE.

 

MEDICATIONS

   1. Zoloft daily.

   2. Iron, unknown dose daily.

 

FAMILY HISTORY:  No inherited diseases.

 

SOCIAL HISTORY:  The patient is a nonsmoker who never smoked, drinks no alcohol
, uses no 

illicit drugs.

 

REVIEW OF SYSTEMS:  A comprehensive review of systems was performed and the 
pertinent positives

are in the HPI, the rest are negative.

 

PHYSICAL EXAMINATION

GENERAL:  Pale-appearing white female, supine.  She is in no distress.

VITAL SIGNS:  Blood pressure 103/56, pulse of 80 in sinus rhythm, afebrile, 
room air saturation

100%. When she reed to use the bedside commode, heart rate reed to 150 in the 
ER.

HEENT:  Shows pallor of the skin and oral mucosa.  She has moist oral mucosa.

NECK:  Without JVD or carotid bruits.

LUNGS:  Clear.

HEART:  Sounds normal.

ABDOMEN:  Soft, nontender.  No organomegaly.

EXTREMITIES:  Without clubbing, cyanosis or edema.  No ecchymoses.

NEUROLOGIC:  Intact.

 

LABORATORY DATA:  Sodium 134, otherwise normal electrolytes.  HCG negative.  
INR 1.6, but this 

is not reliable with the patient on Xarelto.  White blood count 6.9, hemoglobin 
7.1 with an MCV

of 77, platelet count normal at 316.  Urinalysis:  Large blood, high ketones, 
few bacteria and 

a culture was indicated.  



EKG:  Normal sinus rhythm and within normal limits.

 

IMAGING:  No chest x-ray was done.  No head CT or skull films were done.  A 
pelvic ultrasound 

showed blood products in the endometrial canal without definite myometrial or 
endometrial mass.

 

IMPRESSION/DIAGNOSES

1.  Syncope.

2.  Uterine hemorrhage.

3.  Anemia associated with acute blood loss.

4.  Volume depletion due to hemorrhage.

5.  History of pulmonary embolism from birth control pills.

6.  Depression.

 

PLAN

   1. Admit the patient to the ICU given her hemorrhage on anticoagulants and 
watch carefully 

      for worsening of the hemorrhage.

   2. Stop Xarelto.  Do not initiate a Xarelto antagonist as it has a 
prothrombotic risk, and in 

      this patient with pulmonary emboli, she may develop recurrence.

   3. Switch from tampon to pads to assess her blood loss and obtain an OB/GYN 
consult for 

      further recommendations.

   4. Begin transfusion of the patient, 3 units will be initiated.  Follow her 
CBC every 12 

      hours for further need for transfusion.

   5. Order bedrest since an upright posture is associated with symptoms of 
near syncope and 

      full syncope.

   6. Obtain a head CT and, if needed, skull films to rule out fracture, but 
currently there is

      no sign of even an ecchymosis on visual inspection.

   7. There will need to be determination of when to restart, or if she should 
be restarted, on

      an anticoagulant.  I reviewed the summaries from her April hospitalization
, which 

      indicated she might need longer than a 6-month course of anticoagulation 
because of the 

      bilateral and extensive nature of the pulmonary emboli.  A chest CT could 
be performed to

      help in determination of this decision.

   8. Continue with her antidepressants.

 

DEEP VENOUS THROMBOSIS PROPHYLAXIS:  Compression stockings and SCDs.

 

CODE STATUS:  FULL CODE.

 

ATTESTATION:  The patient is expected to be discharged or transferred to 
another facility and 

96 hours:  Yes.

 

 

DD: 07/09/2018 07:05

TD: 07/09/2018 07:18

Job #: [id number]

[email or web address]

## 2018-07-09 NOTE — ED PHYSICIAN DOCUMENTATION
History of Present Illness





- Stated complaint


Stated Complaint: FEMALE 





- Chief complaint


Chief Complaint: Neuro





- History of Present Illness


Timing: Today





- Additonal information


Additional information: 





33-year-old female presents the emergency department for evaluation of syncope 

and vaginal bleeding.  The patient was recently diagnosed with a pulmonary 

embolism 3 months ago and was started on Xarelto.  On July 3 the patient 

started a normal period and has had significant bleeding.  This evening while 

on the commode and bearing down the patient had a syncopal episode.  The 

patient reports feeling lightheaded and dizzy.  The patient reports having a 

heavy period and requiring her tampon to be changed multiple times.  No 

relieving factors.  Symptoms are described as severe.  No other associated 

symptoms.  The patient denies chest pain or palpitations before or after the 

syncopal episode.  No trauma associated with the syncope





Review of Systems


Constitutional: denies: Fever, Fatigue


Eyes: denies: Discharge


Ears: denies: Ear pain


Nose: denies: Congestion


Throat: denies: Sore throat


Cardiac: denies: Chest pain / pressure, Palpitations


Respiratory: denies: Dyspnea, Cough


GI: denies: Abdominal Pain


: reports: Vaginal bleeding


Skin: denies: Rash


Musculoskeletal: denies: Neck pain


Neurologic: reports: Generalized weakness, Syncope.  denies: Head injury


Immunocompromised: denies: Chemotherapy





PD PAST MEDICAL HISTORY





- Past Medical History


Past Medical History: Yes


Cardiovascular: Pulmonary embolism


Respiratory: None


Endocrine/Autoimmune: None


GI: None


GYN: Ovarian cysts


: None


HEENT: None


Psych: Depression


Musculoskeletal: None


Derm: None





- Past Surgical History


Past Surgical History: Yes


HEENT: Tonsil/Adenoidectomy





- Present Medications


Home Medications: 


 Ambulatory Orders











 Medication  Instructions  Recorded  Confirmed


 


Sertraline HCl [Zoloft] 100 mg PO DAILY 04/21/15 04/22/18


 


Rivaroxaban [Xarelto] 15 mg PO Q12H #42 tablet 04/22/18 


 


Rivaroxaban [Xarelto] 20 mg PO DAILY #90 tablet 04/22/18 


 


guaiFENesin [Mucinex] 600 mg PO BID PRN #20 tablet 04/22/18 


 


oxyCODONE [Roxicodone] 5 mg PO Q4HR PRN #42 tablet 04/22/18 














- Allergies


Allergies/Adverse Reactions: 


 Allergies











Allergy/AdvReac Type Severity Reaction Status Date / Time


 


No Known Drug Allergies Allergy   Verified 07/09/18 00:01














- Social History


Does the pt smoke?: No


Smoking Status: Never smoker


Does the pt drink ETOH?: No


Does the pt have substance abuse?: No





- Immunizations


Immunizations are current?: No





- POLST


Patient has POLST: No


POLST Status: Full Code





PD ED PE NORMAL





- General


General: Alert and oriented X 3, No acute distress





- HEENT


HEENT: Atraumatic, PERRL, EOMI, Ears normal





- Neck


Neck: Supple, no meningeal sign





- Respiratory


Respiratory: No respiratory distress





- Abdomen


Abdomen: Normal bowel sounds, Non tender





- Extremities


Extremities: No deformity, No edema





- Neuro


Neuro: Alert and oriented X 3, CNs 2-12 intact, No motor deficit, Normal speech





- Psych


Psych: Normal mood





PD ED PE EXPANDED





- Cardiac


Cardiac: Tachy, Regular Rhythm





- Derm


Derm: Pale





Results





- Vitals


Vitals: 


 Vital Signs - 24 hr











  07/08/18 07/09/18 07/09/18





  23:58 00:38 00:39


 


Temperature 36.6 C  


 


Heart Rate 105 H 158 H 80


 


Respiratory 16 16 13





Rate   


 


Blood Pressure 116/72  


 


O2 Saturation 100 97 100














  07/09/18 07/09/18 07/09/18





  01:07 01:57 02:10


 


Temperature  36.9 C 36.9 C


 


Heart Rate 90 97 80


 


Respiratory 15 15 13





Rate   


 


Blood Pressure 112/74 119/72 112/70


 


O2 Saturation 100 100 














  07/09/18





  02:16


 


Temperature 36.1 C L


 


Heart Rate 92


 


Respiratory 12





Rate 


 


Blood Pressure 112/75


 


O2 Saturation 








 Oxygen











O2 Source                      Room air

















- EKG (time done)


  ** 00:23


Rate: Rate (enter#)


Rhythm: NSR


Intervals: Normal [place]


QRS: Normal


Ischemia: Normal ST segments


Other comments: Other comments (Normal sinus rhythm without acute ischemic 

changes)





- Labs


Labs: 


 Laboratory Tests











  07/09/18 07/09/18 07/09/18





  00:13 00:13 00:13


 


WBC   6.9 


 


RBC   2.84 L 


 


Hgb   7.1 L 


 


Hct   21.9 L 


 


MCV   77.1 L 


 


MCH   24.8 L 


 


MCHC   32.2 


 


RDW   15.6 H 


 


Plt Count   316 


 


MPV   7.6 L 


 


Neut # (Auto)   3.7 


 


Lymph # (Auto)   2.6 


 


Mono # (Auto)   0.5 


 


Eos # (Auto)   0.1 


 


Baso # (Auto)   0.0 


 


Absolute Nucleated RBC   0.00 


 


Nucleated RBC %   0.0 


 


PT    17.8 H


 


INR    1.6 H


 


APTT    29.1


 


Sodium   


 


Potassium   


 


Chloride   


 


Carbon Dioxide   


 


Anion Gap   


 


BUN   


 


Creatinine   


 


Estimated GFR (MDRD)   


 


Glucose   


 


Calcium   


 


Total Bilirubin   


 


AST   


 


ALT   


 


Alkaline Phosphatase   


 


Total Protein   


 


Albumin   


 


Globulin   


 


Albumin/Globulin Ratio   


 


Lipase   


 


HCG, Quant   


 


Urine Color   


 


Urine Clarity   


 


Urine pH   


 


Ur Specific Gravity   


 


Urine Protein   


 


Urine Glucose (UA)   


 


Urine Ketones   


 


Urine Occult Blood   


 


Urine Nitrite   


 


Urine Bilirubin   


 


Urine Urobilinogen   


 


Ur Leukocyte Esterase   


 


Urine RBC   


 


Urine WBC   


 


Ur Squamous Epith Cells   


 


Urine Bacteria   


 


Ur Microscopic Review   


 


Urine Culture Comments   


 


Urine HCG, Qual   


 


Blood Type  AB POSITIVE  


 


Blood Type Recheck   


 


Antibody Screen  NEGATIVE  


 


Crossmatch IS Only   














  07/09/18 07/09/18 07/09/18





  00:13 00:13 00:13


 


WBC   


 


RBC   


 


Hgb   


 


Hct   


 


MCV   


 


MCH   


 


MCHC   


 


RDW   


 


Plt Count   


 


MPV   


 


Neut # (Auto)   


 


Lymph # (Auto)   


 


Mono # (Auto)   


 


Eos # (Auto)   


 


Baso # (Auto)   


 


Absolute Nucleated RBC   


 


Nucleated RBC %   


 


PT   


 


INR   


 


APTT   


 


Sodium  134 L  


 


Potassium  3.5  


 


Chloride  103  


 


Carbon Dioxide  26  


 


Anion Gap  5.0 L  


 


BUN  11  


 


Creatinine  0.7  


 


Estimated GFR (MDRD)  97  


 


Glucose  121 H  


 


Calcium  8.6  


 


Total Bilirubin  0.3  


 


AST  19  


 


ALT  18  


 


Alkaline Phosphatase  47  


 


Total Protein  6.6 L  


 


Albumin  3.7  


 


Globulin  2.9  


 


Albumin/Globulin Ratio  1.3  


 


Lipase  34  


 


HCG, Quant    < 0.60


 


Urine Color   


 


Urine Clarity   


 


Urine pH   


 


Ur Specific Gravity   


 


Urine Protein   


 


Urine Glucose (UA)   


 


Urine Ketones   


 


Urine Occult Blood   


 


Urine Nitrite   


 


Urine Bilirubin   


 


Urine Urobilinogen   


 


Ur Leukocyte Esterase   


 


Urine RBC   


 


Urine WBC   


 


Ur Squamous Epith Cells   


 


Urine Bacteria   


 


Ur Microscopic Review   


 


Urine Culture Comments   


 


Urine HCG, Qual   


 


Blood Type   Cancelled 


 


Blood Type Recheck   


 


Antibody Screen   Cancelled 


 


Crossmatch IS Only   See Detail 














  07/09/18 07/09/18





  00:30 01:20


 


WBC  


 


RBC  


 


Hgb  


 


Hct  


 


MCV  


 


MCH  


 


MCHC  


 


RDW  


 


Plt Count  


 


MPV  


 


Neut # (Auto)  


 


Lymph # (Auto)  


 


Mono # (Auto)  


 


Eos # (Auto)  


 


Baso # (Auto)  


 


Absolute Nucleated RBC  


 


Nucleated RBC %  


 


PT  


 


INR  


 


APTT  


 


Sodium  


 


Potassium  


 


Chloride  


 


Carbon Dioxide  


 


Anion Gap  


 


BUN  


 


Creatinine  


 


Estimated GFR (MDRD)  


 


Glucose  


 


Calcium  


 


Total Bilirubin  


 


AST  


 


ALT  


 


Alkaline Phosphatase  


 


Total Protein  


 


Albumin  


 


Globulin  


 


Albumin/Globulin Ratio  


 


Lipase  


 


HCG, Quant  


 


Urine Color  RED/BLOODY 


 


Urine Clarity  CLOUDY 


 


Urine pH  7.0 


 


Ur Specific Gravity  1.020 


 


Urine Protein  *** 


 


Urine Glucose (UA)  250 H 


 


Urine Ketones  40 H 


 


Urine Occult Blood  LARGE H 


 


Urine Nitrite  *** 


 


Urine Bilirubin  COLOR INTERFERENCE 


 


Urine Urobilinogen  *** 


 


Ur Leukocyte Esterase  *** 


 


Urine RBC  TNTC H 


 


Urine WBC  6-10 H 


 


Ur Squamous Epith Cells  FEW Squamous 


 


Urine Bacteria  Few 


 


Ur Microscopic Review  INDICATED 


 


Urine Culture Comments  INDICATED 


 


Urine HCG, Qual  TNP 


 


Blood Type  


 


Blood Type Recheck   AB POSITIVE


 


Antibody Screen  


 


Crossmatch IS Only  














- Rads (name of study)


  ** US


Radiology: Final report received, See rad report





PD MEDICAL DECISION MAKING





- ED course


Complexity details: other (The case is discussed with the on-call OB/GYN Dr. Bush agrees with the plan for transfusion and a pelvic ultrasound and agrees 

to consult on the patient.  The case is discussed with the hospitalist Dr. Bauer who accepts the patient onto her service.Findings and plan were 

discussed the patient who understands and agrees to the plan.  The plan will be 

to stop the patient's Xarelto, to transfuse the patient 2 units and the 

remainder that hospital for further workup and management.)





- Sepsis Event


Vital Signs: 


 Vital Signs - 24 hr











  07/08/18 07/09/18 07/09/18





  23:58 00:38 00:39


 


Temperature 36.6 C  


 


Heart Rate 105 H 158 H 80


 


Respiratory 16 16 13





Rate   


 


Blood Pressure 116/72  


 


O2 Saturation 100 97 100














  07/09/18 07/09/18 07/09/18





  01:07 01:57 02:10


 


Temperature  36.9 C 36.9 C


 


Heart Rate 90 97 80


 


Respiratory 15 15 13





Rate   


 


Blood Pressure 112/74 119/72 112/70


 


O2 Saturation 100 100 














  07/09/18





  02:16


 


Temperature 36.1 C L


 


Heart Rate 92


 


Respiratory 12





Rate 


 


Blood Pressure 112/75


 


O2 Saturation 








 Oxygen











O2 Source                      Room air

















Departure





- Departure


Disposition: [facility] DC/Xfer


Clinical Impression: 


 Acute anemia, Vaginal bleeding





Syncope


Qualifiers:


 Syncope type: unspecified Qualified Code(s): R55 - Syncope and collapse

## 2018-07-09 NOTE — ULTRASOUND REPORT
Procedure Date:  07/09/2018   

Accession Number:  [id number] / [id number]                    

Procedure:  US  - Pelvic Complete CPT Code:  

 

FULL RESULT:

 

 

EXAM:

PELVIC ULTRASOUND

 

EXAM DATE: 7/9/2018 01:09 AM.

 

CLINICAL HISTORY: Vaginal bleeding.

 

COMPARISON: Pelvic w/transvaginal 10/30/2017 images only.

 

TECHNIQUE: Real-time transabdominal pelvic scan performed to identify the 

uterus and adnexa and as an overview of other pelvic structures, with 

static image documentation.

 

FINDINGS:

Uterus: 8 x 4 x 6 cm, volume 96 cc. Anteverted position. Normal overall 

size and echotexture.

Masses: None.

Endometrium: Thin lining but distended up to 14 mm with heterogeneous 

fluid, likely blood products given history. No vascular mass is seen

Cervix: Unremarkable.

 

Right Ovary: Volume 11 cc. Normal echotexture and blood flow.

Left Ovary: Volume 5 cc. Normal echotexture and blood flow.

 

Free Fluid: None.

 

Other: None.

IMPRESSION:

Blood products within the endometrial canal without definite myometrial 

or endometrial mass seen on this transabdominal study. Clinical follow-up 

suggested.

 

[facility]

## 2018-07-10 VITALS — SYSTOLIC BLOOD PRESSURE: 121 MMHG | DIASTOLIC BLOOD PRESSURE: 70 MMHG

## 2018-07-10 LAB
ANION GAP SERPL CALCULATED.4IONS-SCNC: 3 MMOL/L (ref 6–13)
BASOPHILS NFR BLD AUTO: 0 10^3/UL (ref 0–0.1)
BASOPHILS NFR BLD AUTO: 0.5 %
BUN SERPL-MCNC: 7 MG/DL (ref 6–20)
CALCIUM UR-MCNC: 7.5 MG/DL (ref 8.5–10.3)
CHLORIDE SERPL-SCNC: 112 MMOL/L (ref 101–111)
CO2 SERPL-SCNC: 23 MMOL/L (ref 21–32)
CREAT SERPLBLD-SCNC: 0.6 MG/DL (ref 0.4–1)
EOSINOPHIL # BLD AUTO: 0.1 10^3/UL (ref 0–0.7)
EOSINOPHIL NFR BLD AUTO: 1.4 %
ERYTHROCYTE [DISTWIDTH] IN BLOOD BY AUTOMATED COUNT: 16.9 % (ref 12–15)
GFRSERPLBLD MDRD-ARVRAT: 116 ML/MIN/{1.73_M2} (ref 89–?)
GLUCOSE SERPL-MCNC: 109 MG/DL (ref 70–100)
HGB UR QL STRIP: 9.6 G/DL (ref 12–16)
LYMPHOCYTES # SPEC AUTO: 1.8 10^3/UL (ref 1.5–3.5)
LYMPHOCYTES NFR BLD AUTO: 32 %
MAGNESIUM SERPL-MCNC: 1.9 MG/DL (ref 1.7–2.8)
MCH RBC QN AUTO: 28.1 PG (ref 27–31)
MCHC RBC AUTO-ENTMCNC: 33.2 G/DL (ref 32–36)
MCV RBC AUTO: 84.5 FL (ref 81–99)
MONOCYTES # BLD AUTO: 0.4 10^3/UL (ref 0–1)
MONOCYTES NFR BLD AUTO: 7.4 %
NEUTROPHILS # BLD AUTO: 3.3 10^3/UL (ref 1.5–6.6)
NEUTROPHILS # SNV AUTO: 5.7 X10^3/UL (ref 4.8–10.8)
NEUTROPHILS NFR BLD AUTO: 58.7 %
PDW BLD AUTO: 7.6 FL (ref 7.9–10.8)
PHOSPHATE BLD-MCNC: 2.4 MG/DL (ref 2.5–4.6)
PLATELET # BLD: 219 10^3/UL (ref 130–450)
RBC MAR: 3.41 10^6/UL (ref 4.2–5.4)
SODIUM SERPLBLD-SCNC: 138 MMOL/L (ref 135–145)

## 2018-07-10 RX ADMIN — FAMOTIDINE SCH MG: 20 TABLET, FILM COATED ORAL at 08:59

## 2018-07-10 RX ADMIN — FERROUS GLUCONATE SCH MG: 324 TABLET ORAL at 08:59

## 2018-07-10 RX ADMIN — SERTRALINE HYDROCHLORIDE SCH MG: 50 TABLET ORAL at 08:59

## 2018-07-10 NOTE — DISCHARGE SUMMARY
Discharge Summary


Admit Date: 07/09/18


Discharge Date: 07/10/18


Discharging Provider: Nathanael Sloan MD


Primary Care Provider: Sebastian Romo


Code Status: Attempt Resuscitation


Condition at Discharge: Good


Discharge Disposition: 01 Home, Self Care





- DIAGNOSES


Admission Diagnoses: 





1.  Syncope


2.  Uterine hemorrhage


3.  Anemia associated with acute blood loss


4.  Volume depletion due to hemorrhage


5.  History of pulmonary embolism from birth control pills


6.  Depression


Discharge Diagnoses with Status of Each Condition: 





1.  Anemia due to acute blood loss: Stable


2.  Menorrhagia: Stable


3.  Syncope: Resolved


4.  Volume depletion secondary to hemorrhage: Resolved


5.  History of pulmonary emboli: Stable


6.  Depression: Stable





- HPI


History of Present Illness: 





Patient is a 33-year-old  female with past medical history significant 

for depression on Zoloft and history of ovarian cyst, for which she was on 

birth control pills.  She developed bilateral pulmonary emboli that were felt 

to be from the birth control pills in April of this year.  She required to stay 

under observation for control of pleuritic chest pain and initiation of 

Xarelto.  Her birth control pills were discontinued at that time.  Since that 

time she has had 2 menstrual periods.  She had one a month ago that lasted for 

8 days and was heavy.  She did see her primary care provider within these last 

3 months and states that hemoglobin was done and was borderline anemic.  She 

was started on iron tablets, which she has only taken for the last 2 days.  In 

this month she started her period on 7/3/2018, which has been very heavy.  This 

was especially so in the last 2-3 days where she was requiring changing her 

tampon approximately 40 times a day and is very saturated.  Yesterday, she felt 

lightheaded and had tunnel vision briefly and new to sit down and lie down with 

this.  Last night while sitting on the toilet to change her tampon she again 

felt dizzy and this time had a syncopal episode.  She fell forward and hit her 

head against the wall and then slumped to the ground lying forward.  The patient

's  heard her fall and came to the bathroom.  When she did not answer on 

not, he found her on the floor, already awakening and crying.  She was able to 

be assisted upright and sat down while the  got ready to take her to the 

emergency department which she did.  In the emergency room she states she was 

lightheaded again when being upright and going to a bedside commode.  There 

have been no further episodes of syncope.  She has a history of syncope when 

she sees blood and clots.  That had happened approximately 4 times in her life.





- CONSULTS | PROCEDURES


Consultations: Obstetrics and Gyenecology: Dr. Parker





- HOSPITAL COURSE


Hospital Course: 





On presentation to the emergency department the patient was tachycardic and 

appeared pale and continued to be dizzy when she stood up.  The patient's 

hemoglobin on presentation was 7.1.  She was having persistent menstrual 

bleeding.  The patient was transfused 3 units of packed RBCs during the 

hospitalization.  The patient's blood pressure remained stable.  Patient's 

tachycardia resolved.  Patient had no further dizziness.  Patient's hemoglobin 

improved to 9.6 and remained stable for 24 hours.  The patient's Xarelto was 

initially held when she presented to the emergency department.  The patient's 

bleeding had resolved on the day of discharge.  The patient was restarted on 

her Xarelto.  She will be continued on Xarelto to complete 3 months of 

treatment for pulmonary embolism provoked from birth control pills.  This is 

the minimum duration of treatment as per the journal of thrombosis and 

hemostasis for hormone associated PE.  She has 12 days remaining for treatment 

and will have completed 3 months on 7/22/2018.  She was advised to continue the 

Xarelto till 7/22/2018 and then stop as she would have completed treatment for 

her pulmonary embolism.  She was advised to follow-up with her primary care 

physician for further testing of hypercoagulable states.  The patient's 

menorrhagia should resolve as it was likely due to the use of Xarelto.  If the 

patient does continue to have menorrhagia despite stopping the Xarelto then she 

will need to follow-up with obstetrics and gynecology.  If the patient 

continues to have symptoms from her ovarian cysts she will also need to follow-

up with obstetrics and gynecology.  The patient was also advised if she has any 

further bleeding then she should return to the emergency department.





- ALLERGIES


Allergies/Adverse Reactions: 


 Allergies











Allergy/AdvReac Type Severity Reaction Status Date / Time


 


No Known Drug Allergies Allergy   Verified 07/09/18 00:01














- MEDICATIONS


Home Medications: 


 Ambulatory Orders











 Medication  Instructions  Recorded  Confirmed


 


Sertraline HCl [Zoloft] 100 mg PO DAILY 04/21/15 07/09/18


 


Rivaroxaban [Xarelto] 20 mg PO DAILY #90 tablet 07/10/18 07/09/18














- PHYSICAL EXAM AT DISCHARGE


General Appearance: positive: No acute distress, Alert


Eyes Bilateral: positive: Normal inspection, PERRL, EOMI, No lid inflammation, 

Conjunctivae nml, No scleral icterus


ENT: positive: ENT inspection nml, Pharynx nml, No signs of dehydration.  

negative: Purulent nasal drainage, Pharyngeal erythema, Oral lesions


Neck: positive: Nml inspection, Thyroid nml, No JVD, Trachea midline, 

Thyromegaly.  negative: Lymphadenopathy (R), Lymphadenopathy (L), Stiff neck, 

Carotid bruit, Tracheal deviation


Respiratory: positive: Chest non-tender, No respiratory distress, Breath sounds 

nml.  negative: Wheezes, Rales, Rhonchi


Cardiovascular: positive: Regular rate & rhythm, No murmur, No gallop


Peripheral Pulses: positive: 2+


Abdomen: positive: Non-tender, No organomegaly, Nml bowel sounds, No 

distention.  negative: Guarding, Rebound, Hepatomegaly


Back: positive: Nml inspection.  negative: CVA tenderness (R), CVA tenderness (L

)


Skin: positive: Color nml, No rash, Warm.  negative: Cyanosis, Diaphoresis, 

Pallor


Extremities: positive: Non-tender, Full ROM, Nml appearance, No pedal edema


Neurologic/Psychiatric: positive: Oriented x3, CN's nml (2-12), Motor nml, 

Sensation nml, Mood/affect nml





- LABS


Result Diagrams: 


 07/10/18 05:25





 07/10/18 05:25


Other Lab Results: 








 Laboratory Last Values











WBC  5.7 x10^3/uL (4.8-10.8)   07/10/18  05:25    


 


RBC  3.41 10^6/uL (4.20-5.40)  L  07/10/18  05:25    


 


Hgb  9.6 g/dL (12.0-16.0)  L  07/10/18  05:25    


 


Hct  28.8 % (37.0-47.0)  L  07/10/18  05:25    


 


MCV  84.5 fL (81.0-99.0)   07/10/18  05:25    


 


MCH  28.1 pg (27.0-31.0)   07/10/18  05:25    


 


MCHC  33.2 g/dL (32.0-36.0)   07/10/18  05:25    


 


RDW  16.9 % (12.0-15.0)  H  07/10/18  05:25    


 


Plt Count  219 10^3/uL (130-450)   07/10/18  05:25    


 


MPV  7.6 fL (7.9-10.8)  L  07/10/18  05:25    


 


Neut # (Auto)  3.3 10^3/uL (1.5-6.6)   07/10/18  05:25    


 


Lymph # (Auto)  1.8 10^3/uL (1.5-3.5)   07/10/18  05:25    


 


Mono # (Auto)  0.4 10^3/uL (0.0-1.0)   07/10/18  05:25    


 


Eos # (Auto)  0.1 10^3/uL (0.0-0.7)   07/10/18  05:25    


 


Baso # (Auto)  0.0 10^3/uL (0.0-0.1)   07/10/18  05:25    


 


Absolute Nucleated RBC  0.01 x10^3/uL  07/10/18  05:25    


 


Nucleated RBC %  0.1 /100WBC  07/10/18  05:25    


 


PT  17.8 secs (9.9-12.6)  H  07/09/18  00:13    


 


INR  1.6  (0.8-1.2)  H  07/09/18  00:13    


 


APTT  29.1 secs (24.9-33.3)   07/09/18  00:13    


 


Sodium  138 mmol/L (135-145)   07/10/18  05:25    


 


Potassium  3.5 mmol/L (3.5-5.0)   07/10/18  05:25    


 


Chloride  112 mmol/L (101-111)  H  07/10/18  05:25    


 


Carbon Dioxide  23 mmol/L (21-32)   07/10/18  05:25    


 


Anion Gap  3.0  (6-13)  L  07/10/18  05:25    


 


BUN  7 mg/dL (6-20)   07/10/18  05:25    


 


Creatinine  0.6 mg/dL (0.4-1.0)   07/10/18  05:25    


 


Estimated GFR (MDRD)  116  (>89)   07/10/18  05:25    


 


Glucose  109 mg/dL ()  H  07/10/18  05:25    


 


Calcium  7.5 mg/dL (8.5-10.3)  L  07/10/18  05:25    


 


Phosphorus  2.4 mg/dL (2.5-4.6)  L  07/10/18  05:25    


 


Magnesium  1.9 mg/dL (1.7-2.8)   07/10/18  05:25    


 


Total Bilirubin  0.3 mg/dL (0.2-1.0)   07/09/18  00:13    


 


AST  19 IU/L (10-42)   07/09/18  00:13    


 


ALT  18 IU/L (10-60)   07/09/18  00:13    


 


Alkaline Phosphatase  47 IU/L ()   07/09/18  00:13    


 


Total Protein  6.6 g/dL (6.7-8.2)  L  07/09/18  00:13    


 


Albumin  2.6 g/dL (3.2-5.5)  L  07/10/18  05:25    


 


Globulin  2.9 g/dL (2.1-4.2)   07/09/18  00:13    


 


Albumin/Globulin Ratio  1.3  (1.0-2.2)   07/09/18  00:13    


 


Lipase  34 U/L (22-51)   07/09/18  00:13    


 


HCG, Quant  < 0.60 mIU/mL  07/09/18  00:13    


 


Urine Color  RED/BLOODY   07/09/18  00:30    


 


Urine Clarity  CLOUDY  (CLEAR)   07/09/18  00:30    


 


Urine pH  7.0 PH (5.0-7.5)   07/09/18  00:30    


 


Ur Specific Gravity  1.020  (1.002-1.030)   07/09/18  00:30    


 


Urine Protein  *** mg/dL (NEGATIVE)   07/09/18  00:30    


 


Urine Glucose (UA)  250 mg/dL (NEGATIVE)  H  07/09/18  00:30    


 


Urine Ketones  40 mg/dL (NEGATIVE)  H  07/09/18  00:30    


 


Urine Occult Blood  LARGE  (NEGATIVE)  H  07/09/18  00:30    


 


Urine Nitrite  ***  (NEGATIVE)   07/09/18  00:30    


 


Urine Bilirubin  COLOR INTERFERENCE  (NEGATIVE)   07/09/18  00:30    


 


Urine Urobilinogen  *** E.U./dL (NORMAL)   07/09/18  00:30    


 


Ur Leukocyte Esterase  ***  (NEGATIVE)   07/09/18  00:30    


 


Urine RBC  TNTC /HPF (0-5)  H  07/09/18  00:30    


 


Urine WBC  6-10 /HPF (0-5)  H  07/09/18  00:30    


 


Ur Squamous Epith Cells  FEW Squamous  (<= Few)   07/09/18  00:30    


 


Urine Bacteria  Few /HPF (None Seen)   07/09/18  00:30    


 


Ur Microscopic Review  INDICATED   07/09/18  00:30    


 


Urine Culture Comments  INDICATED   07/09/18  00:30    


 


Urine HCG, Qual  TNP   07/09/18  00:30    


 


Blood Type  AB POSITIVE   07/09/18  00:13    


 


Blood Type Recheck  AB POSITIVE   07/09/18  01:20    


 


Antibody Screen  NEGATIVE   07/09/18  00:13    


 


Crossmatch IS Only  See Detail   07/09/18  00:13    














- DIAGNOSTIC IMAGING


Diagnostic Imaging Results: Final report reviewed


Diagnostic Imaging Results Comments: 





Pelvic ultrasound


Impression:


Blood products within the endometrial canal without definite myometrial or 

endometrial mass seen on this transabdominal study.  Clinical follow-up 

suggested.





- FOLLOW UP


Follow Up: 





Patient will continue Xarelto for 12 more days till 7/22/2018 at that point she 

will have completed 3 months of treatment for provoked PE from birth control 

pills.  She will follow-up with her primary care physician for further workup 

for hypercoagulable states.  The patient will return to the emergency 

department if she has any further bleeding.  At the time of discharge the 

patient's bleeding had resolved and her hemoglobin had remained stable after 

she received 3 units of packed RBCs in the hospital.





- TIME SPENT


Time Spent in Discharge (Minutes): 40

## 2018-07-10 NOTE — DISCHARGE PLAN
Discharge Plan


Disposition: 01 Home, Self Care


Condition: Good


Diet: Regular


Activity Restrictions: No Restrictions


Shower Restrictions: No


Driving Restrictions: No


Weight Bearing: Full Weight


Additional Instructions or Follow Up instructions: 


You presented to our emergency department after you passed out at home.  You 

have been having heavy menstrual periods since you were started on Xarelto for 

treatment of pulmonary embolism.  While you were hospitalized you were 

transfused 3 units of packed red blood cells and your blood counts improved. 

Your bleeding has now stopped. I am recommending that you continue to take your 

xarelto till 07/22/18 to complete 3 months of treatment for your PE. You have 

already stopped taking the birth control pills so 3 months of treatment is 

adequate. Since you will complete treatment before your next period it is ok to 

continue with xarelto for now. Please follow up with your PCP for additional 

testing once you have completed treatment. 


No Smoking: If you smoke, Please STOP!  Call [phone] for help.


Follow-up with: 


Sebastian Romo DO [Primary Care Provider] -

## 2018-09-20 ENCOUNTER — HOSPITAL ENCOUNTER (OUTPATIENT)
Dept: HOSPITAL 76 - LAB.WCP | Age: 33
Discharge: HOME | End: 2018-09-20
Attending: FAMILY MEDICINE
Payer: COMMERCIAL

## 2018-09-20 DIAGNOSIS — N91.2: Primary | ICD-10-CM

## 2018-09-20 PROCEDURE — 36415 COLL VENOUS BLD VENIPUNCTURE: CPT

## 2018-09-20 PROCEDURE — 84702 CHORIONIC GONADOTROPIN TEST: CPT

## 2018-09-25 ENCOUNTER — HOSPITAL ENCOUNTER (OUTPATIENT)
Dept: HOSPITAL 76 - LAB.WCP | Age: 33
Discharge: HOME | End: 2018-09-25
Attending: FAMILY MEDICINE
Payer: COMMERCIAL

## 2018-09-25 DIAGNOSIS — N91.2: Primary | ICD-10-CM

## 2018-09-25 PROCEDURE — 84702 CHORIONIC GONADOTROPIN TEST: CPT

## 2018-09-25 PROCEDURE — 36415 COLL VENOUS BLD VENIPUNCTURE: CPT

## 2019-01-16 ENCOUNTER — HOSPITAL ENCOUNTER (OUTPATIENT)
Dept: HOSPITAL 76 - DI | Age: 34
Discharge: HOME | End: 2019-01-16
Attending: FAMILY MEDICINE
Payer: COMMERCIAL

## 2019-01-16 DIAGNOSIS — M79.604: Primary | ICD-10-CM

## 2019-01-16 NOTE — ULTRASOUND REPORT
Reason:  LEG PAIN, RIGHT

Procedure Date:  01/16/2019   

Accession Number:  [id number] / [id number]                    

Procedure:  US  - Duplex Ext Veins Right CPT Code:  

 

FULL RESULT:

 

 

EXAM:

RIGHT LOWER EXTREMITY VENOUS ULTRASOUND

 

EXAM DATE: 1/16/2019 10:42 PM.

 

CLINICAL HISTORY: LEG PAIN, RIGHT.

 

COMPARISON: None.

 

TECHNIQUE: Real-time sonographic vascular imaging was performed by the 

sonographer through the lower extremity utilizing both color-flow and 

Doppler spectral analysis. Multiple representative static images were 

saved for review.

 

FINDINGS:

Common Femoral Vein (CFV): Normal.

CFV-GSV Junction: Normal.

Profunda Femoral Vein (PFV): Normal.

Femoral Vein (FV) Prox: Normal.

Femoral Vein (FV) Mid: Normal.

Femoral Vein (FV) Dist: Normal.

Popliteal Vein: Normal.

Posterior Tibial Veins: Normal.

Peroneal Veins: Normal.

IMPRESSION: No evidence for deep venous thrombosis.

 

[facility]

## 2020-08-14 ENCOUNTER — HOSPITAL ENCOUNTER (OUTPATIENT)
Dept: HOSPITAL 76 - LAB | Age: 35
Discharge: HOME | End: 2020-08-14
Attending: OBSTETRICS & GYNECOLOGY
Payer: COMMERCIAL

## 2020-08-14 DIAGNOSIS — Z86.711: ICD-10-CM

## 2020-08-14 DIAGNOSIS — Z36.89: Primary | ICD-10-CM

## 2020-08-14 LAB
BASOPHILS NFR BLD AUTO: 0 10^3/UL (ref 0–0.1)
BASOPHILS NFR BLD AUTO: 0.4 %
EOSINOPHIL # BLD AUTO: 0.1 10^3/UL (ref 0–0.7)
EOSINOPHIL NFR BLD AUTO: 0.7 %
ERYTHROCYTE [DISTWIDTH] IN BLOOD BY AUTOMATED COUNT: 13 % (ref 12–15)
HGB UR QL STRIP: 11.7 G/DL (ref 12–16)
LYMPHOCYTES # SPEC AUTO: 1.7 10^3/UL (ref 1.5–3.5)
LYMPHOCYTES NFR BLD AUTO: 24.6 %
MCH RBC QN AUTO: 27.7 PG (ref 27–31)
MCHC RBC AUTO-ENTMCNC: 32.6 G/DL (ref 32–36)
MCV RBC AUTO: 84.9 FL (ref 81–99)
MONOCYTES # BLD AUTO: 0.6 10^3/UL (ref 0–1)
MONOCYTES NFR BLD AUTO: 9 %
NEUTROPHILS # BLD AUTO: 4.4 10^3/UL (ref 1.5–6.6)
NEUTROPHILS # SNV AUTO: 6.8 X10^3/UL (ref 4.8–10.8)
NEUTROPHILS NFR BLD AUTO: 65 %
PDW BLD AUTO: 9.8 FL (ref 7.9–10.8)
PLATELET # BLD: 222 10^3/UL (ref 130–450)
RBC MAR: 4.23 10^6/UL (ref 4.2–5.4)

## 2020-08-14 PROCEDURE — 86762 RUBELLA ANTIBODY: CPT

## 2020-08-14 PROCEDURE — 85025 COMPLETE CBC W/AUTO DIFF WBC: CPT

## 2020-08-14 PROCEDURE — 86146 BETA-2 GLYCOPROTEIN ANTIBODY: CPT

## 2020-08-14 PROCEDURE — 86900 BLOOD TYPING SEROLOGIC ABO: CPT

## 2020-08-14 PROCEDURE — 86803 HEPATITIS C AB TEST: CPT

## 2020-08-14 PROCEDURE — 86850 RBC ANTIBODY SCREEN: CPT

## 2020-08-14 PROCEDURE — 86147 CARDIOLIPIN ANTIBODY EA IG: CPT

## 2020-08-14 PROCEDURE — 85730 THROMBOPLASTIN TIME PARTIAL: CPT

## 2020-08-14 PROCEDURE — 87389 HIV-1 AG W/HIV-1&-2 AB AG IA: CPT

## 2020-08-14 PROCEDURE — 85613 RUSSELL VIPER VENOM DILUTED: CPT

## 2020-08-14 PROCEDURE — 86901 BLOOD TYPING SEROLOGIC RH(D): CPT

## 2020-08-14 PROCEDURE — 86592 SYPHILIS TEST NON-TREP QUAL: CPT

## 2020-08-14 PROCEDURE — 36415 COLL VENOUS BLD VENIPUNCTURE: CPT

## 2020-08-14 PROCEDURE — 81599 UNLISTED MAAA: CPT

## 2020-08-14 PROCEDURE — 87340 HEPATITIS B SURFACE AG IA: CPT

## 2020-08-14 PROCEDURE — 81241 F5 GENE: CPT

## 2020-08-15 LAB
HEPATITIS B SURFACE ANTIGEN: (no result)
HEPATITIS C ANTIBODY: (no result)
HIV AG/AB 4TH GEN: (no result)
SIGNAL TO CUT-OFF: 0 (ref ?–1)

## 2020-08-17 LAB
CARDIOLIPIN AB IGG: <14 GPL
CARDIOLIPIN AB IGM: <12 MPL

## 2020-08-18 LAB — F5 GENE MUT ANL BLD/T: (no result)

## 2020-12-24 ENCOUNTER — HOSPITAL ENCOUNTER (OUTPATIENT)
Dept: HOSPITAL 76 - LAB | Age: 35
Discharge: HOME | End: 2020-12-24
Attending: OBSTETRICS & GYNECOLOGY
Payer: COMMERCIAL

## 2020-12-24 DIAGNOSIS — Z36.89: Primary | ICD-10-CM

## 2020-12-24 LAB
BASOPHILS NFR BLD AUTO: 0 10^3/UL (ref 0–0.1)
BASOPHILS NFR BLD AUTO: 0.3 %
EOSINOPHIL # BLD AUTO: 0 10^3/UL (ref 0–0.7)
EOSINOPHIL NFR BLD AUTO: 0.6 %
ERYTHROCYTE [DISTWIDTH] IN BLOOD BY AUTOMATED COUNT: 13.4 % (ref 12–15)
HGB UR QL STRIP: 11 G/DL (ref 12–16)
LYMPHOCYTES # SPEC AUTO: 1.2 10^3/UL (ref 1.5–3.5)
LYMPHOCYTES NFR BLD AUTO: 18 %
MCH RBC QN AUTO: 29 PG (ref 27–31)
MCHC RBC AUTO-ENTMCNC: 32.4 G/DL (ref 32–36)
MCV RBC AUTO: 89.7 FL (ref 81–99)
MONOCYTES # BLD AUTO: 0.5 10^3/UL (ref 0–1)
MONOCYTES NFR BLD AUTO: 6.9 %
NEUTROPHILS # BLD AUTO: 5 10^3/UL (ref 1.5–6.6)
NEUTROPHILS # SNV AUTO: 6.8 X10^3/UL (ref 4.8–10.8)
NEUTROPHILS NFR BLD AUTO: 73.5 %
PDW BLD AUTO: 9.6 FL (ref 7.9–10.8)
PLATELET # BLD: 215 10^3/UL (ref 130–450)
RBC MAR: 3.79 10^6/UL (ref 4.2–5.4)

## 2020-12-24 PROCEDURE — 85025 COMPLETE CBC W/AUTO DIFF WBC: CPT

## 2020-12-24 PROCEDURE — 36415 COLL VENOUS BLD VENIPUNCTURE: CPT

## 2020-12-24 PROCEDURE — 82950 GLUCOSE TEST: CPT

## 2021-01-14 ENCOUNTER — HOSPITAL ENCOUNTER (OUTPATIENT)
Dept: HOSPITAL 76 - DI | Age: 36
Discharge: HOME | End: 2021-01-14
Attending: OBSTETRICS & GYNECOLOGY
Payer: COMMERCIAL

## 2021-01-14 DIAGNOSIS — O09.90: Primary | ICD-10-CM

## 2021-01-15 NOTE — ULTRASOUND REPORT
PROCEDURE:  OB F/U or Repeat

 

INDICATIONS:  SUPERVISION HIGH RISK PREGNANCY

 

OUTSIDE/PRIOR DATING DATA:  

Last menstrual period (LMP): 6/16/2020.  

LMP-based estimated date of delivery (ERICKSON): 3/23/2021.  

First dating scan (date and location): 8/7/2020.  

Estimated date of delivery (ERICKSON) from first dating scan: 3/20/2021.  

 

TECHNIQUE: 

Real-time scanning was performed of the fetus, with image documentation and biometric measurements.  


Endovaginal scanning:  Not performed

 

COMPARISON:  8/7/2020, 9/15/2020, 10/8/2020, 11/12/2020, 11/17/2020.

 

FINDINGS:  

 

General:  A single living intrauterine gestation is present.  

Presentation:  Vertex

Placenta:  Placental position is posterior, without previa.  

Amniotic fluid index:  13.4 cm, within normal limits. Largest pocket measures 5.4 cm

Fetal heart rate:  149 beats per minute.  

Maternal cervical canal:  5.3 cm long; normal length is 2.5 cm or more.  

 

Fetal biometrics:  

Biparietal diameter:  7.9 cm, 30 weeks 5 days

Head circumference:  28.3 cm, 31 weeks 0 days

Abdominal circumference:  28.5 cm, 32 weeks 4 days

Femur length:  5.6 cm, 29 weeks 3 days

Estimated gestational age from initial scan: not applicable.  

Composite gestational age from present scan:  30 weeks 6 days

Estimated fetal weight and percentile: 1737 g

Measurement variability in biometric dating: +/- 10 days from 12-20 weeks gestation, +/- 2 weeks from
 20-30 weeks gestation, +/- 3 weeks at 30 weeks gestation or more.  

 

Other:  Not applicable.  

 

IMPRESSION:  Single living intrauterine fetus in vertex presentation demonstrating expected interval 
growth

 

 

Reviewed by: Blake Velez MD on 1/15/2021 10:56 AM PST

Approved by: Blake Velez MD on 1/15/2021 10:56 AM PST

 

 

Station ID:  [id number]

## 2021-02-08 ENCOUNTER — HOSPITAL ENCOUNTER (OUTPATIENT)
Dept: HOSPITAL 76 - WFO | Age: 36
Discharge: HOME | End: 2021-02-08
Attending: OBSTETRICS & GYNECOLOGY
Payer: COMMERCIAL

## 2021-02-08 VITALS — DIASTOLIC BLOOD PRESSURE: 70 MMHG | SYSTOLIC BLOOD PRESSURE: 127 MMHG

## 2021-02-08 DIAGNOSIS — Z86.711: ICD-10-CM

## 2021-02-08 DIAGNOSIS — Z3A.33: ICD-10-CM

## 2021-02-08 DIAGNOSIS — Z79.01: ICD-10-CM

## 2021-02-08 DIAGNOSIS — O09.893: Primary | ICD-10-CM

## 2021-02-08 PROCEDURE — 59025 FETAL NON-STRESS TEST: CPT

## 2021-02-09 NOTE — PROCEDURE REPORT
- HPI


Diagnosis/Indication for NST: Other (History if pumonary embolus; on 

anticoagulation (high risk medication))


                                                               Current Pregnancy





EDU                              21


Gestation                        33 Weeks and 6 Days


                          3


Para                             1





Vital Signs











Temperature  98.2 F   21 14:07


 


Heart Rate  94   21 14:07


 


Respiratory Rate  20   21 14:07


 


Blood Pressure  127/70   21 14:07


 


O2 Saturation  99   21 14:07




















Temperature  98.2 F   21 14:07


 


Heart Rate  94   21 14:07


 


Respiratory Rate  20   21 14:07


 


Blood Pressure  127/70   21 14:07


 


O2 Saturation  99   21 14:07














- NST Procedure


NST Procedure





Start Date                       21


Start Time                       14:00


Stop Time                        14:29


Vibroacoustic Stimulation Used   No


Patient States Fetal Movement    Yes











- Results and Plan


Findings/Impression: 





Baseline: 


Variability: Moderate


Accelerations: Present


Decelerations: Absent


Trends in FHR over time: no changes


[place] contractions in 10 minutes: 2-3, not feeling her contractions


Impression: reactive Category 1 NST

## 2021-02-11 ENCOUNTER — HOSPITAL ENCOUNTER (OUTPATIENT)
Dept: HOSPITAL 76 - WFO | Age: 36
Discharge: HOME | End: 2021-02-11
Attending: OBSTETRICS & GYNECOLOGY
Payer: COMMERCIAL

## 2021-02-11 ENCOUNTER — HOSPITAL ENCOUNTER (OUTPATIENT)
Dept: HOSPITAL 76 - DI | Age: 36
Discharge: HOME | End: 2021-02-11
Attending: OBSTETRICS & GYNECOLOGY
Payer: COMMERCIAL

## 2021-02-11 VITALS — DIASTOLIC BLOOD PRESSURE: 80 MMHG | SYSTOLIC BLOOD PRESSURE: 126 MMHG

## 2021-02-11 DIAGNOSIS — Z51.81: ICD-10-CM

## 2021-02-11 DIAGNOSIS — Z3A.34: ICD-10-CM

## 2021-02-11 DIAGNOSIS — O09.893: Primary | ICD-10-CM

## 2021-02-11 DIAGNOSIS — O09.93: Primary | ICD-10-CM

## 2021-02-11 DIAGNOSIS — Z79.01: ICD-10-CM

## 2021-02-11 PROCEDURE — 82731 ASSAY OF FETAL FIBRONECTIN: CPT

## 2021-02-11 PROCEDURE — 59025 FETAL NON-STRESS TEST: CPT

## 2021-02-11 PROCEDURE — 99213 OFFICE O/P EST LOW 20 MIN: CPT

## 2021-02-11 NOTE — ULTRASOUND REPORT
PROCEDURE:  OB Fetal Biophysical Profile

 

INDICATIONS:  SUPERVISION OF HIGH RISK PREGNANCY

 

OUTSIDE/PRIOR DATING DATA:  

Last menstrual period (LMP): 6/16/2020.  

LMP-based estimated date of delivery (ERICKSON): 3/23/2021.  

First dating scan (date and location): 8/7/2020.  

Estimated date of delivery (ERICKSON) from first dating scan: 3/23/2021 (provider stated).  

 

TECHNIQUE:  Real-time scanning was performed of the fetus, with image documentation and biometric christopher
surements.  Biophysical profile was also obtained.  

Endovaginal scanning:  Not performed

 

COMPARISON:  1/14/2021

 

FINDINGS:  

 

General:  A single living intrauterine gestation is present.  Estimated gestational age is approximat
ely 34 weeks and 2 days

Presentation:  Vertex

Placenta:  Placental position is posterior fundal, without previa.  

Amniotic fluid index:  14.9 cm, 55th percentile for gestational age.  Largest vertical pocket measure
d 4.9 cm

Fetal heart rate:  144 beats per minute. 

Maternal cervical canal:  3.7 cm long; normal length is 2.5 cm or more.  

 

Biophysical profile:  

Tone:  2 points.

Movement:  2 points.  

Respiration:  2 points.  

Largest pocket of fluid:  2 points.  

 

Umbilical artery Doppler:  Normal S/D ratios and cord Doppler waveforms.

 

IMPRESSION:  

 

1. Single living intrauterine gestation with estimated gestational age of approximately 34 weeks and 
2 days. Expected interval growth has occurred.

2. Normal biophysical profile score of 8 out of 8 with normal S/D ratios and cord Doppler waveforms

 

Reviewed by: Emre Yanez MD on 2/11/2021 1:56 PM PST

Approved by: Emre Yanez MD on 2/11/2021 1:56 PM PST

 

 

Station ID:  [id number]

## 2021-02-11 NOTE — PROCEDURE REPORT
- HPI


Diagnosis/Indication for NST: Other (High risk medication use (anticoagulation))


                                                               Current Pregnancy





EDU                              21


Gestation                        34 Weeks and 2 Days


                          2


Para                             1





Vital Signs











Temperature  98.2 F   21 12:46


 


Heart Rate  98   21 12:46


 


Respiratory Rate  20   21 12:46


 


Blood Pressure  126/80   21 12:46


 


O2 Saturation  100   21 12:46




















Temperature  98.2 F   21 12:46


 


Heart Rate  98   21 12:46


 


Respiratory Rate  20   21 12:46


 


Blood Pressure  126/80   21 12:46


 


O2 Saturation  100   21 12:46














- NST Procedure


NST Procedure





Start Date                       21


Start Time                       12:52


Stop Time                        13:20


Vibroacoustic Stimulation Used   No


Patient States Fetal Movement    Yes











- Results and Plan


Findings/Impression: 





Baseline: 


Variability: Moderate


Accelerations: Present


Decelerations: Absent


Trends in FHR over time: no changes


[place] contractions in 10 minutes: 2-3


Impression: reactive Category 1 NST





S:  Patient here for scheduled NST and reported feeling more contraction 

intensity than usual.  This frequency is common for her.  No VB, no LOF.  Good 

FM.  


O:  SVE closed with RN.  FFN neg.  


A/P:  Threatened  labor in 33rd week, no evidence of  labor.  Pt 

reassured and advised to f/u PRN increasing pain or other PTL sx.  Seen by CNM. 

Plan for routine care with clinic f/u in 1-2 weeks.  NST performed for 

anticoagulation use--normal--continue those 2x per week.

## 2021-02-15 ENCOUNTER — HOSPITAL ENCOUNTER (OUTPATIENT)
Dept: HOSPITAL 76 - WFO | Age: 36
Discharge: HOME | End: 2021-02-15
Attending: OBSTETRICS & GYNECOLOGY
Payer: COMMERCIAL

## 2021-02-15 VITALS — DIASTOLIC BLOOD PRESSURE: 66 MMHG | SYSTOLIC BLOOD PRESSURE: 117 MMHG

## 2021-02-15 DIAGNOSIS — Z79.01: ICD-10-CM

## 2021-02-15 DIAGNOSIS — O09.893: Primary | ICD-10-CM

## 2021-02-15 DIAGNOSIS — Z86.718: ICD-10-CM

## 2021-02-15 PROCEDURE — 59025 FETAL NON-STRESS TEST: CPT

## 2021-02-16 NOTE — PROCEDURE REPORT
- HPI


Diagnosis/Indication for NST: Other (High risk pregnancy, taking high-risk 

medication (lovenox), history of DVT)


                                                               Current Pregnancy





EDU                              21


Gestation                        35 Weeks and 5 Days


                          3


Para                             1





Vital Signs











Temperature  98.1 F   02/15/21 13:34


 


Heart Rate  94   02/15/21 13:34


 


Respiratory Rate  18   02/15/21 13:34


 


Blood Pressure  117/66   02/15/21 13:34


 


O2 Saturation  98   02/15/21 13:34




















Temperature  98.1 F   02/15/21 13:34


 


Heart Rate  94   02/15/21 13:34


 


Respiratory Rate  18   02/15/21 13:34


 


Blood Pressure  117/66   02/15/21 13:34


 


O2 Saturation  98   02/15/21 13:34














- NST Procedure


NST Procedure





Start Date                       02/15/21


Start Time                       12:34


Stop Time                        13:14


Vibroacoustic Stimulation Used   No


Patient States Fetal Movement    Yes











- Results and Plan


Findings/Impression: 





Baseline: 


Variability: Moderate


Accelerations: Present


Decelerations: Absent


Trends in FHR over time: no changes


[place] contractions in 10 minutes: 0-1


Impression: reactive Category 1 NST

## 2021-02-18 ENCOUNTER — HOSPITAL ENCOUNTER (OUTPATIENT)
Dept: HOSPITAL 76 - DI | Age: 36
Discharge: HOME | End: 2021-02-18
Attending: OBSTETRICS & GYNECOLOGY
Payer: COMMERCIAL

## 2021-02-18 ENCOUNTER — HOSPITAL ENCOUNTER (OUTPATIENT)
Dept: HOSPITAL 76 - WFO | Age: 36
Discharge: HOME | End: 2021-02-18
Attending: OBSTETRICS & GYNECOLOGY
Payer: COMMERCIAL

## 2021-02-18 VITALS — SYSTOLIC BLOOD PRESSURE: 126 MMHG | DIASTOLIC BLOOD PRESSURE: 79 MMHG

## 2021-02-18 DIAGNOSIS — I82.409: ICD-10-CM

## 2021-02-18 DIAGNOSIS — O88.213: ICD-10-CM

## 2021-02-18 DIAGNOSIS — O09.90: Primary | ICD-10-CM

## 2021-02-18 DIAGNOSIS — Z3A.35: ICD-10-CM

## 2021-02-18 DIAGNOSIS — O22.33: ICD-10-CM

## 2021-02-18 PROCEDURE — 59025 FETAL NON-STRESS TEST: CPT

## 2021-02-18 NOTE — ULTRASOUND REPORT
PROCEDURE:  OB Fetal Biophysical Profile

 

INDICATIONS:  SUPERVISION OF HIGH RISK PREGNANCY

 

OUTSIDE/PRIOR DATING DATA:  

Last menstrual period (LMP): 6/16/2020.  

LMP-based estimated date of delivery (ERICKSON): 3/23/2021.  

First dating scan (date and location): 8/7/2020.  

Estimated date of delivery (ERICKSON) from first dating scan: 3/20/2021. ERICKSON by the provider is 3/23/2021.
.  

 

TECHNIQUE:  Real-time scanning was performed of the fetus, with image documentation and biometric christopher
surements.  Biophysical profile was also obtained.  

Endovaginal scanning:  No

 

COMPARISON:  Prior biophysical profile dated 2/11/2021

 

FINDINGS:  

 

General:  A single living intrauterine gestation is present.  

Presentation:  Vertex

Placenta:  Placental position is posterior fundal, without previa.  

Amniotic fluid index:  11.9 cm, 29th percentile for gestational age.  

Fetal heart rate:  150 beats per minute. 

Maternal cervical canal. Suboptimally Visualized.

 

 

Biophysical profile:  

Tone:  2 points.

Movement:  2 points.  

Respiration:  2 points.  

Largest pocket of fluid:  2 points.  

 

Umbilical artery Doppler:  Normal cord Doppler ratios ranging between 2.3 and 3.1

 

IMPRESSION:  

 

Single living IUP redemonstrated with age estimated at 35 weeks 2 days by prior ultrasound.

 

Normal biophysical profile with score of 8 out of 8 possible points.

 

Normal cord Doppler examination.

 

Reviewed by: MICAELA Samuels on 2/18/2021 5:25 PM PST

Approved by: Blake Velez MD on 2/18/2021 5:25 PM PST

 

 

Station ID:  [id number]

## 2021-02-22 ENCOUNTER — HOSPITAL ENCOUNTER (OUTPATIENT)
Dept: HOSPITAL 76 - WFO | Age: 36
Discharge: HOME | End: 2021-02-22
Attending: OBSTETRICS & GYNECOLOGY
Payer: COMMERCIAL

## 2021-02-22 VITALS — DIASTOLIC BLOOD PRESSURE: 81 MMHG | SYSTOLIC BLOOD PRESSURE: 136 MMHG

## 2021-02-22 DIAGNOSIS — O09.893: Primary | ICD-10-CM

## 2021-02-22 DIAGNOSIS — Z3A.35: ICD-10-CM

## 2021-02-22 DIAGNOSIS — Z79.01: ICD-10-CM

## 2021-02-22 DIAGNOSIS — Z86.711: ICD-10-CM

## 2021-02-22 PROCEDURE — 59025 FETAL NON-STRESS TEST: CPT

## 2021-02-22 NOTE — PROCEDURE REPORT
- HPI


Diagnosis/Indication for NST: Other (anticoagulation for hx of VTE)


                                                               Current Pregnancy





EDU                              21


Gestation                        35 Weeks and 6 Days


                          3


Para                             1





Vital Signs











Temperature  98.4 F   21 15:11


 


Heart Rate  103 H  21 15:11


 


Respiratory Rate  17   21 15:11


 


Blood Pressure  136/81 H  21 15:11


 


O2 Saturation  100   21 15:11




















Temperature  98.4 F   21 15:11


 


Heart Rate  103 H  21 15:11


 


Respiratory Rate  17   21 15:11


 


Blood Pressure  136/81 H  21 15:11


 


O2 Saturation  100   21 15:11














- NST Procedure


NST Procedure





Start Date                       21


Start Time                       14:00


Stop Time                        14:25


Vibroacoustic Stimulation Used   No


Patient States Fetal Movement    Yes








 mod jesse 15x15 accels no decels


TOCO: CTX Q3-4 min, mild








- Results and Plan


Findings/Impression: 





36 yo  at 35+6 wga on anticoagulation for hx of PE here for NST





Cat I tracing





Contractions every 3-4 min, mild





SVE 1/long/high/med/mid





No significant change from prior exam





Reviewed warning signs for labor and need to return to clinic/triage


Not planning on epidural . 


Takes Lovenox at 9pm. Will hold if contractions intensify








Cont with twice weekly NST and weekly REKAH


Labor precautions reviewed





DX: IUP at 35+6 wga


     Anticoagulation; hx of PE


     False labor

## 2021-02-25 ENCOUNTER — HOSPITAL ENCOUNTER (OUTPATIENT)
Dept: HOSPITAL 76 - DI | Age: 36
Discharge: HOME | End: 2021-02-25
Attending: OBSTETRICS & GYNECOLOGY
Payer: COMMERCIAL

## 2021-02-25 ENCOUNTER — HOSPITAL ENCOUNTER (OUTPATIENT)
Dept: HOSPITAL 76 - WFO | Age: 36
Discharge: HOME | End: 2021-02-25
Attending: OBSTETRICS & GYNECOLOGY
Payer: COMMERCIAL

## 2021-02-25 VITALS — SYSTOLIC BLOOD PRESSURE: 124 MMHG | DIASTOLIC BLOOD PRESSURE: 72 MMHG

## 2021-02-25 DIAGNOSIS — O22.33: Primary | ICD-10-CM

## 2021-02-25 DIAGNOSIS — Z79.01: ICD-10-CM

## 2021-02-25 DIAGNOSIS — O09.93: Primary | ICD-10-CM

## 2021-02-25 DIAGNOSIS — I82.409: ICD-10-CM

## 2021-02-25 DIAGNOSIS — Z3A.36: ICD-10-CM

## 2021-02-25 DIAGNOSIS — Z3A.34: ICD-10-CM

## 2021-02-25 PROCEDURE — 59025 FETAL NON-STRESS TEST: CPT

## 2021-02-26 NOTE — ULTRASOUND REPORT
PROCEDURE:  OB Fetal Biophysical Profile

 

INDICATIONS:  SUPERVISION OF HIGH RISK PREGNANCY

 

OUTSIDE/PRIOR DATING DATA:  

Last menstrual period (LMP): 6/16/2020.  

LMP-based estimated date of delivery (ERICKSON): 3/23/2021.  

First dating scan (date and location): 8/7/2020.  

Estimated date of delivery (ERICKSON) from first dating scan: 3/20/2021.  

 

TECHNIQUE:  Real-time scanning was performed of the fetus, with image documentation and biometric christopher
surements.  Biophysical profile was also obtained.  

Endovaginal scanning:  No

 

COMPARISON:  Prior OB biophysical profile dated 2/18/2021

 

FINDINGS:  

 

General:  A single living intrauterine gestation is present.  

Presentation:  Vertex

Placenta:  Placental position is posterior fundal, without previa.  

Amniotic fluid index:  14.9 cm, normal for gestational age.  

Fetal heart rate:  144 beats per minute. 

Maternal cervical canal:  2.7 cm long; normal length is 2.5 cm or more.  

 

Estimated gestational age from initial scan: 34 weeks 2 days

 

 

Biophysical profile:  

Tone:  2 points.

Movement:  2 points.  

Respiration:  2 points.  

Largest pocket of fluid:  2 points.  

 

Umbilical artery Doppler:  Normal cord Doppler with ratios ranging between 2.5 and 3.1

 

IMPRESSION:  

1. Single living IUP redemonstrated.

2. Normal biophysical profile score 8 out of 8 possible point

3. Nnormal cord Doppler exam.

 

Reviewed by: MICAELA Samuels on 2/26/2021 8:00 AM PST

Approved by: Eloise Chang MD on 2/26/2021 8:00 AM PST

 

 

Station ID:  [id number]

## 2021-02-26 NOTE — LABOR FLOWSHEET
===================================

Labor Flowsheet

===================================

Datetime Report Generated by CPN: 02/26/2021 10:18

   

   

===========================

Datetime: 02/26/2021 08:59

===========================

   

   

===================================

VITAL SIGNS

===================================

   

 NBP Sys/Carpice/Mean (mmHg):  135

:  80

:  93

 Pulse:  119

   

===================================

COMMUNICATION

===================================

   

 LaborFlag:  Labor

   

===========================

Datetime: 02/25/2021 13:53

===========================

   

 SpO2 (%):  99

   

===========================

Datetime: 02/18/2021 13:34

===========================

   

 Respirations:  19

 Temperature (C):  36.7

   

===========================

Datetime: 02/08/2021 14:01

===========================

   

Stage of Pregnancy:  Labor

## 2021-03-01 ENCOUNTER — HOSPITAL ENCOUNTER (OUTPATIENT)
Dept: HOSPITAL 76 - WFO | Age: 36
Discharge: HOME | End: 2021-03-01
Attending: OBSTETRICS & GYNECOLOGY
Payer: COMMERCIAL

## 2021-03-01 ENCOUNTER — HOSPITAL ENCOUNTER (OUTPATIENT)
Dept: HOSPITAL 76 - LAB | Age: 36
Discharge: HOME | End: 2021-03-01
Attending: OBSTETRICS & GYNECOLOGY
Payer: COMMERCIAL

## 2021-03-01 VITALS — DIASTOLIC BLOOD PRESSURE: 72 MMHG | SYSTOLIC BLOOD PRESSURE: 116 MMHG

## 2021-03-01 DIAGNOSIS — O22.33: Primary | ICD-10-CM

## 2021-03-01 DIAGNOSIS — Z79.01: ICD-10-CM

## 2021-03-01 DIAGNOSIS — Z36.85: Primary | ICD-10-CM

## 2021-03-01 DIAGNOSIS — Z36.85: ICD-10-CM

## 2021-03-01 DIAGNOSIS — Z3A.36: ICD-10-CM

## 2021-03-01 PROCEDURE — 87797 DETECT AGENT NOS DNA DIR: CPT

## 2021-03-01 PROCEDURE — 59025 FETAL NON-STRESS TEST: CPT

## 2021-03-01 NOTE — PROCEDURE REPORT
- HPI


Diagnosis/Indication for NST: Other (anticoagulation)


                                                               Current Pregnancy





EDU                              21


Gestation                        36 Weeks and 6 Days


                          3


Para                             1





Vital Signs











Temperature  98.2 F   21 12:02


 


Heart Rate  100   21 12:02


 


Respiratory Rate  16   21 12:02


 


Blood Pressure  116/72   21 12:02


 


O2 Saturation  98   21 12:02




















Temperature  98.2 F   21 12:02


 


Heart Rate  100   21 12:02


 


Respiratory Rate  16   21 12:02


 


Blood Pressure  116/72   21 12:02


 


O2 Saturation  98   21 12:02














- NST Procedure


NST Procedure





Start Date                       21


Start Time                       12:00


Stop Time                        12:25


Vibroacoustic Stimulation Used   No


Patient States Fetal Movement    Yes








 mod jesse 15x15 accels  no Decel


TOCO: Q5-10 mild














- Results and Plan


Findings/Impression: 





34 yo  at 36+6 wga on anticoagulation here for NST





Cat I tracing





Cont with twice weekyl NST and weekly REKHA





Prodromal labor, non-painful contractions. 


-Has had serial exams in palst with no change


-Declines exam today





DX: 


Anticoagulation


Hx of DVT


IUP at 36+6 wga

## 2021-03-01 NOTE — PROCEDURE REPORT
- HPI


Diagnosis/Indication for NST: Other (anticoagulation 2/2 hx of provoked DVT)


                                                               Current Pregnancy





EDU                              21


Gestation                        36 Weeks and 2 Days


                          2


Para                             1





Vital Signs











Temperature  97.9 F   21 13:32


 


Heart Rate  93   21 13:32


 


Respiratory Rate  16   21 13:32


 


Blood Pressure  124/72   21 13:32


 


O2 Saturation  100   21 13:32




















Temperature  97.9 F   21 13:32


 


Heart Rate  93   21 13:32


 


Respiratory Rate  16   21 13:32


 


Blood Pressure  124/72   21 13:32


 


O2 Saturation  100   21 13:32














- NST Procedure


NST Procedure





Start Date                       21


Start Time                       13:30


Stop Time                        13:50


Vibroacoustic Stimulation Used   No


Patient States Fetal Movement    Yes





EFM: 135 mod jesse 15x15 accels no decels


TOCO: irritable

















- Results and Plan


Findings/Impression: 





36 yo  at 36+2 wga with pregnancy complicated by anticoagulation use for hx 

of DVT here for NST





Cat I tracing








Cnt with twice weekly NST and weekly REKHA until 39 wga


IOL at 39 wga





DX: IUP at 36+2, anticoagulation therapy

## 2021-03-04 ENCOUNTER — HOSPITAL ENCOUNTER (OUTPATIENT)
Dept: HOSPITAL 76 - DI | Age: 36
Discharge: HOME | End: 2021-03-04
Attending: OBSTETRICS & GYNECOLOGY
Payer: COMMERCIAL

## 2021-03-04 ENCOUNTER — HOSPITAL ENCOUNTER (OUTPATIENT)
Dept: HOSPITAL 76 - WFO | Age: 36
Discharge: HOME | End: 2021-03-04
Attending: OBSTETRICS & GYNECOLOGY
Payer: COMMERCIAL

## 2021-03-04 VITALS — SYSTOLIC BLOOD PRESSURE: 123 MMHG | DIASTOLIC BLOOD PRESSURE: 89 MMHG

## 2021-03-04 DIAGNOSIS — O09.899: Primary | ICD-10-CM

## 2021-03-04 DIAGNOSIS — Z79.01: ICD-10-CM

## 2021-03-04 DIAGNOSIS — Z3A.00: ICD-10-CM

## 2021-03-04 DIAGNOSIS — Z86.711: ICD-10-CM

## 2021-03-04 DIAGNOSIS — O09.93: Primary | ICD-10-CM

## 2021-03-04 PROCEDURE — 59025 FETAL NON-STRESS TEST: CPT

## 2021-03-04 NOTE — ULTRASOUND REPORT
PROCEDURE:  OB Fetal Biophysical Profile

 

INDICATIONS:  SUPERVISION OF HIGH RISK PREGNANCY

 

OUTSIDE/PRIOR DATING DATA:  

Last menstrual period (LMP): 6/16/2020.  

LMP-based estimated date of delivery (ERICKSON): 3/23/2021.  

First dating scan (date and location): 8/7/2020.  

Estimated date of delivery (ERICKSON) from first dating scan: 3/20/2021.  

 

TECHNIQUE:  Real-time scanning was performed of the fetus, with image documentation and biometric christopher
surements.  Biophysical profile was also obtained.  

Endovaginal scanning:  Not needed

 

COMPARISON:  All prior available OB ultrasound studies for this pregnancy.

 

FINDINGS:  

 

General:  A single living intrauterine gestation is present.  

Presentation:  Vertex

Placenta:  Placental position is posterior fundal, without previa.  

Amniotic fluid index:  14.0 cm, 53 percentile for gestational age.  

Fetal heart rate:  141 beats per minute.  

Maternal cervical canal: Normal, closed.

 

 

Biophysical profile:  

Tone:  2 points.

Movement:  2 points.  

Respiration:  2 points.  

Largest pocket of fluid:  2 points.  

 

Umbilical artery Doppler:  Normal at the cord insertion, middle third of the cord and at the placenta
 where well seen.

 

IMPRESSION:  

Normal biophysical profile, normal umbilical artery systolic/diastolic ratio where well seen. Normal 
amniotic fluid volume. Current fetal presentation is vertex.

 

Reviewed by: Shimon Amador MD on 3/4/2021 4:25 PM PST

Approved by: Shimon Amador MD on 3/4/2021 4:25 PM PST

 

 

Station ID:  [id number]

## 2021-03-05 NOTE — PROCEDURE REPORT
- HPI


Diagnosis/Indication for NST: Other (History if pumonary embolus; on 

anticoagulation (high risk medication))





- NST Procedure


NST Procedure





Start Time                       12:00


Stop Time                        12:25











- Results and Plan


Findings/Impression: 





Baseline: 


Variability: Moderate


Accelerations: Present


Decelerations: Absent


Trends in FHR over time: baseline change to 155 BPM


[place] contractions in 10 minutes: 0-1   


Impression: reactive Category 1 NST

## 2021-03-08 ENCOUNTER — HOSPITAL ENCOUNTER (OUTPATIENT)
Dept: HOSPITAL 76 - WFO | Age: 36
Discharge: HOME | End: 2021-03-08
Attending: OBSTETRICS & GYNECOLOGY
Payer: COMMERCIAL

## 2021-03-08 VITALS — DIASTOLIC BLOOD PRESSURE: 69 MMHG | SYSTOLIC BLOOD PRESSURE: 140 MMHG

## 2021-03-08 DIAGNOSIS — Z3A.37: ICD-10-CM

## 2021-03-08 DIAGNOSIS — O22.33: Primary | ICD-10-CM

## 2021-03-08 DIAGNOSIS — Z79.01: ICD-10-CM

## 2021-03-08 PROCEDURE — 59025 FETAL NON-STRESS TEST: CPT

## 2021-03-08 NOTE — PROCEDURE REPORT
- HPI


Diagnosis/Indication for NST: Other (High risk pregnancy, taking high-risk 

medication (lovenox), history of DVT)


                                                               Current Pregnancy





EDU                              21


Gestation                        37 Weeks and 6 Days


                          3


Para                             1





Vital Signs











Temperature  98.2 F   21 11:19


 


Heart Rate  96   21 11:19


 


Respiratory Rate  16   21 11:19


 


Blood Pressure  140/69 H  21 11:19


 


O2 Saturation  99   21 11:19




















Temperature  98.2 F   21 11:19


 


Heart Rate  96   21 11:19


 


Respiratory Rate  16   21 11:19


 


Blood Pressure  140/69 H  21 11:19


 


O2 Saturation  99   21 11:19














- NST Procedure


NST Procedure





Start Date                       21


Start Time                       11:15


Stop Time                        11:40


Vibroacoustic Stimulation Used   No


Patient States Fetal Movement    Yes











- Results and Plan


Findings/Impression: 


Baseline: 


Variability: Moderate


Accelerations: Present


Decelerations: Absent


Trends in FHR over time: no changes


[place] contractions in 10 minutes: 0


Impression: reactive Category 1 NST

## 2021-03-11 ENCOUNTER — HOSPITAL ENCOUNTER (INPATIENT)
Dept: HOSPITAL 76 - WFO | Age: 36
LOS: 1 days | Discharge: HOME | End: 2021-03-12
Attending: OBSTETRICS & GYNECOLOGY | Admitting: OBSTETRICS & GYNECOLOGY
Payer: COMMERCIAL

## 2021-03-11 DIAGNOSIS — T38.5X5A: ICD-10-CM

## 2021-03-11 DIAGNOSIS — Z3A.38: ICD-10-CM

## 2021-03-11 DIAGNOSIS — I26.99: ICD-10-CM

## 2021-03-11 DIAGNOSIS — Z79.899: ICD-10-CM

## 2021-03-11 DIAGNOSIS — D64.9: ICD-10-CM

## 2021-03-11 DIAGNOSIS — Z86.59: ICD-10-CM

## 2021-03-11 DIAGNOSIS — Z20.822: ICD-10-CM

## 2021-03-11 DIAGNOSIS — Z79.01: ICD-10-CM

## 2021-03-11 LAB
BASOPHILS NFR BLD AUTO: 0 10^3/UL (ref 0–0.1)
BASOPHILS NFR BLD AUTO: 0.2 %
EOSINOPHIL # BLD AUTO: 0 10^3/UL (ref 0–0.7)
EOSINOPHIL NFR BLD AUTO: 0.4 %
ERYTHROCYTE [DISTWIDTH] IN BLOOD BY AUTOMATED COUNT: 14.1 % (ref 12–15)
HCT VFR BLD AUTO: 37.8 % (ref 37–47)
HGB UR QL STRIP: 12.6 G/DL (ref 12–16)
IGF BP1 VAG QL: POSITIVE
LYMPHOCYTES # SPEC AUTO: 1.7 10^3/UL (ref 1.5–3.5)
LYMPHOCYTES NFR BLD AUTO: 20.5 %
MCH RBC QN AUTO: 29.2 PG (ref 27–31)
MCHC RBC AUTO-ENTMCNC: 33.3 G/DL (ref 32–36)
MCV RBC AUTO: 87.5 FL (ref 81–99)
MONOCYTES # BLD AUTO: 0.7 10^3/UL (ref 0–1)
MONOCYTES NFR BLD AUTO: 8.4 %
NEUTROPHILS # BLD AUTO: 5.8 10^3/UL (ref 1.5–6.6)
NEUTROPHILS # SNV AUTO: 8.3 X10^3/UL (ref 4.8–10.8)
NEUTROPHILS NFR BLD AUTO: 70 %
NRBC # BLD AUTO: 0 /100WBC
NRBC # BLD AUTO: 0 X10^3/UL
PDW BLD AUTO: 10.7 FL (ref 7.9–10.8)
PLATELET # BLD: 211 10^3/UL (ref 130–450)
RBC MAR: 4.32 10^6/UL (ref 4.2–5.4)

## 2021-03-11 PROCEDURE — 86850 RBC ANTIBODY SCREEN: CPT

## 2021-03-11 PROCEDURE — 36415 COLL VENOUS BLD VENIPUNCTURE: CPT

## 2021-03-11 PROCEDURE — 99213 OFFICE O/P EST LOW 20 MIN: CPT

## 2021-03-11 PROCEDURE — 86900 BLOOD TYPING SEROLOGIC ABO: CPT

## 2021-03-11 PROCEDURE — 85025 COMPLETE CBC W/AUTO DIFF WBC: CPT

## 2021-03-11 PROCEDURE — 84112 EVAL AMNIOTIC FLUID PROTEIN: CPT

## 2021-03-11 PROCEDURE — 86920 COMPATIBILITY TEST SPIN: CPT

## 2021-03-11 PROCEDURE — 87635 SARS-COV-2 COVID-19 AMP PRB: CPT

## 2021-03-11 PROCEDURE — 86901 BLOOD TYPING SEROLOGIC RH(D): CPT

## 2021-03-11 PROCEDURE — 0UQG7ZZ REPAIR VAGINA, VIA NATURAL OR ARTIFICIAL OPENING: ICD-10-PCS | Performed by: OBSTETRICS & GYNECOLOGY

## 2021-03-11 RX ADMIN — ACETAMINOPHEN PRN MG: 325 TABLET ORAL at 09:51

## 2021-03-11 RX ADMIN — IBUPROFEN SCH MG: 600 TABLET, FILM COATED ORAL at 21:43

## 2021-03-11 RX ADMIN — ACETAMINOPHEN PRN MG: 325 TABLET ORAL at 16:04

## 2021-03-11 RX ADMIN — ACETAMINOPHEN PRN MG: 325 TABLET ORAL at 21:43

## 2021-03-11 RX ADMIN — IBUPROFEN SCH MG: 600 TABLET, FILM COATED ORAL at 09:51

## 2021-03-11 RX ADMIN — IBUPROFEN SCH MG: 600 TABLET, FILM COATED ORAL at 16:04

## 2021-03-11 RX ADMIN — DOCUSATE SODIUM SCH MG: 100 CAPSULE, LIQUID FILLED ORAL at 09:51

## 2021-03-11 RX ADMIN — DOCUSATE SODIUM SCH MG: 100 CAPSULE, LIQUID FILLED ORAL at 21:43

## 2021-03-11 NOTE — DELIVERY NOTE
Delivery Note





- Labor


Labor: positive: Spontaneous





- Infant Delivery Method


Infant Delivery Method: positive: Spontaneous vaginal delivery





- Birth Presentation


Birth Presentation: positive: Vertex





- Nuchal Cord


Nuchal Cord: positive: None





- Anesthetic


Anesthetic: positive: Lidocaine - 1% plain


Volume: positive: Other (15cc)





- Amniotic Fluid Description


Amniotic Fluid Description: positive: Clear





- Episiotomy Type


Episiotomy Type: positive: None





- Laceration


Laceration: positive: [place] (left), Vaginal





- Suture


Suture Type: positive: Vicryl


Suture Size: positive: 2-0, 4-0





- Delivery Outcome


Delivery Outcome: positive: Livebirth





- 


[place]: positive: Placed in direct skin contact with mother, Stimulated, 

Warmed, [place] used


 sex: positive: Female


: Apgars 9/9





- Cord


Cord: positive: 3 vessels





- Placenta


Placenta: positive: Intact, Spontaneous





- Estimated Blood Loss


Estimated Blood Loss (in cc): 350





- Post Delivery Events


Post Delivery Events: positive: No post delivery events





- Delivery Comments (Free Text/Narrative)


Delivery Comments (Free Text/Narrative): 





Uncomplicated delivery, normal anatomy restored with suturing.  Plan for 

postpartum tubal.

## 2021-03-11 NOTE — HISTORY & PHYSICAL EXAMINATION
Prenatal Admit History





- Pregnancy


Smoking Status: Never smoker





- Other Maternal History


Other Maternal History: 





CC:  broken bag of water





HPI:  SROM clear at 23:30 following contractions starting at 22:00.  


UCs for the past few days, last dose of lovenox was 3/9.  No VB.  Good FM.  





PMH:  PE hx provoked by OCP


Exercise-induced asthma


Anemia


Depression


History of PPH


Blood transfusion after hemorrhage on xarelto





PSH:  Tonsillectomy, LEEP 





Allergies:  none.  Contraindications: estrogen (provoked PE)





Meds:  iron, PNV.  Lovenox 40mg daily last taken 3/9/21.  





SH:  no t/e/d





FH:  no anesthesia complications





ROS:  no cough or fever





OB:  .  5w SAB.   at term 2013, 8#9oz, PPH


Dating:  


US on 20 gives ERICKSON 3/23/2020


US on 2020  at 7w6d gives ERICKSON 3/20/2020, cwd


AB+/ Rub imm


Genetics: thickened NT. normal cf DNA, Declined amnio.


FAS Normal 22 weeks scan.  Posterior placenta


Growth US on 2021 EFW 61%


TDaP  complete, flu vax complete


Glucola 123


HSV: denies


GBS neg


MOD: anticipate 


Desires BTL- Consents for BTL signed at 34 weeks





O:  AVSS


SVE with RN 3.5/70/-2


Category 1 NST


Nadine q1-3min


ROM test positive





A/P:  34yo  at 38w2d by LMP c/w 7w US with spontaneous contractions and SROM

clear at 23:30.  





Pregnancy complicated by lovenox use due to hx of PE, last dose was 3/9 so no 

special care needed.  SCD if gets epidural.  Will resume lovenox postpartum.  





Hx of PPH:  type and cross for 2, hemorrhage meds in room at delivery, active 

mgmt of 3rd stage of labor planned. 





Hx of depression, stable off of meds:  pt doesn't want to immediately resume 

zoloft.  Will watch PP.  





Fetus:  Vertex, EFW = 8.25#, normal anatomy scan, normal CF DNA.  NT was 

thickened, pt was seen by MFM.  Echo per pt was normal. Cat 1 NST.  Clear fluid.





Labor: spontaneous, anticipate .  May have epidural if desired.  GBS neg. 





Tubal:  pt desires sterilization, declines reversible methods.  Wants whether 

she has a  or a vaginal delivery.  Leave epidural in place for tubal if

she gets one for pain control in labor.  





Postpartum:  PPTL.  Rh+, RI, VI, s/p Tdap and flu vax.  Watch depression.








Meds/Allgy





- Home Medications


Home Medications: 


                                Ambulatory Orders











 Medication  Instructions  Recorded  Confirmed


 


Enoxaparin [Lovenox] 40 mg SUBQ Q24H 21


 


Prenatal No122/Iron/Folic Acid 1 tab ORAL DAILY 21





[Prenatal Multi Tablet]   














- Allergies


Allergies/Adverse Reactions: 


                                    Allergies











Allergy/AdvReac Type Severity Reaction Status Date / Time


 


No Known Drug Allergies Allergy   Verified 21 02:24














Physical





- Abdominal Exam


Vital Signs: 





                                        











Temp Pulse Resp BP Pulse Ox


 


 98.4 F   91   22   118/64   100 


 


 21 02:05  21 02:05  21 02:05  21 02:05  21 02:05

## 2021-03-12 VITALS — DIASTOLIC BLOOD PRESSURE: 74 MMHG | SYSTOLIC BLOOD PRESSURE: 127 MMHG

## 2021-03-12 RX ADMIN — IBUPROFEN SCH MG: 600 TABLET, FILM COATED ORAL at 05:51

## 2021-03-12 RX ADMIN — ACETAMINOPHEN PRN MG: 325 TABLET ORAL at 05:52

## 2021-03-12 RX ADMIN — DOCUSATE SODIUM SCH MG: 100 CAPSULE, LIQUID FILLED ORAL at 11:39

## 2021-03-12 RX ADMIN — ACETAMINOPHEN PRN MG: 325 TABLET ORAL at 11:39

## 2021-03-12 RX ADMIN — IBUPROFEN SCH MG: 600 TABLET, FILM COATED ORAL at 11:39

## 2021-03-12 NOTE — DISCHARGE SUMMARY
Physician: Yany Chu MD

DATE OF ADMISSION: 03/11/2021

DATE OF DISCHARGE:  

 

ADMISSION DIAGNOSES:  

1.  Spontaneous labor at term.

2.  History of pulmonary embolism.

 

DISCHARGE DIAGNOSES:  

1.  Status post spontaneous vaginal delivery at term.

 

PROCEDURES:  On 03/11/2021, spontaneous delivery of a liveborn female, Apgars were 9 and 9.  EBL was 
350 mL

 

HOSPITAL COURSE:  The patient was admitted in active spontaneous labor.  She did not receive an epidu
ral.  She had an uncomplicated labor course and vaginal delivery.  Her postpartum course was also unc
omplicated.  Her Lovenox was restarted at 24 hours, at home.  She was not having any problems with bl
eeding, urination, breastfeeding or mood.  Her pain was under good control.  

 

DISCHARGE EXAMINATION:

VITAL SIGNS:  She is afebrile with normal vital signs. 

GENERAL:  Alert and pleasant, in no apparent distress.  She was smiling.

ABDOMEN:  Soft, nontender, nondistended.  Fundus firm, nontender at the umbilicus.

EXTREMITIES:  No lower extremity clubbing, cyanosis, edema or cords.

 

DISCHARGE MEDICATIONS:  

1.  Tylenol p.r.n. pain.

2.  Colace p.r.n. to soften stool.  

3.  Lovenox, continue at 40 mg daily with plan to continue this for 6-8 weeks postpartum.

 

DISCHARGE DISPOSITION:  Home.

 

CONDITION:  Good.

 

FOLLOWUP:  Followup in 1 week with Dr. Pena.

 

 

DD: 03/12/2021 08:14

TD: 03/12/2021 08:16

Job #: [id number]

## 2021-03-12 NOTE — DISCHARGE PLAN
Discharge Plan


Problem Reviewed?: Yes


Disposition: 01 Home, Self Care


Condition: Good


Prescriptions: 


Docusate Sodium 100Mg Capsule [Colace 100Mg Capsule] 100 mg PO BID PRN #30 cap


 PRN Reason: to soften stool


Diet: Regular


Shower Restrictions: No


Driving Restrictions: No


No Smoking: If you smoke, Please STOP!  Call [phone] for help.


Follow-up with: 


Sunita Pena MD [Provider Admit Priv/Credential] - 1 Week

## 2021-03-12 NOTE — LABOR FLOWSHEET
===================================

Labor Flowsheet

===================================

Datetime Report Generated by CPN: 03/12/2021 12:14

   

   

===========================

Datetime: 03/12/2021 08:17

===========================

   

   

===================================

VITAL SIGNS

===================================

   

 NBP Sys/Caprice/Mean (mmHg):  127

:  74

:  88

 Pulse:  86

   

===========================

Datetime: 03/12/2021 05:49

===========================

   

 SpO2 (%):  97

   

===========================

Datetime: 03/11/2021 09:15

===========================

   

Stage of Pregnancy:  Recovery

 Respirations:  16

 Temperature (C):  37.0

   

===========================

Datetime: 03/11/2021 09:14

===========================

   

 LaborFlag:  Labor

   

===========================

Datetime: 03/11/2021 07:53

===========================

   

 Comments:  questionable FHR vs Maternal heart rate. audbile around 140s whennot pushing. provider aw
are. baby born at 0753 with apgars of 9/9

   

===========================

Datetime: 03/11/2021 07:45

===========================

   

   

===================================

UTERINE ACTIVITY

===================================

   

 Monitor Mode:  External

 Frequency (min):  2-3

 Quality:  Strong

 Duration (sec):  60-90

 Pattern:  Tachysystole:  > 5 Contractions in 10 Minutes

 Resting Tone (Palpate):  Relaxed

 FHR Baseline Rate :  145

   

===========================

Datetime: 03/11/2021 07:32

===========================

   

   

===================================

STAGE 2

===================================

   

 Pushing:  Urge to Push

 Pushing Position:  Pushing with Contractions

 Pushing Progress:  Descent with Pushing

   

===========================

Datetime: 03/11/2021 07:31

===========================

   

   

===================================

VAGINAL EXAM

===================================

   

 Dilatation (cm):  10.0

 Exam by:  elvi

   

===========================

Datetime: 03/11/2021 07:30

===========================

   

 Contraction Comments:  no augmentation, tomlinson called into room, see provider note

 Monitor Interventions for FHR:  Ultrasound Adjusted

 Variability:  Moderate 6-25 bpm

 Decelerations:  Late

 Communication Comments:  elvi at bedside

   

===========================

Datetime: 03/11/2021 07:22

===========================

   

 Station:  1

 Vaginal Exam Comments:  anterior lip

   

===================================

COMMUNICATION

===================================

   

 Communication:  Call/Page Placed to Provider

   

===========================

Datetime: 03/11/2021 07:20

===========================

   

 Patient Position/Activity:  Semi-Fowlers

 Patient Care Comments:  pt feeling more urge to push

   

===========================

Datetime: 03/11/2021 07:02

===========================

   

 Pain Assessment Comments:  instructed to breathe throught ctxs and not push

   

===========================

Datetime: 03/11/2021 06:55

===========================

   

   

===================================

PAIN

===================================

   

 Pain Scale:  8

 Pain Presence:  Intermittent

 Pain Type:  Contraction; Pressure

 Pain Location:  Abdomen; Perineum

 Pain Relief Measures:  Pain Medication Given

 Pain Coping:  Breathing Through Contractions

 Comfort Measures:  Family Support

   

===========================

Datetime: 03/11/2021 06:54

===========================

   

 Hygiene:  Noris Care; Underpad Changed

   

===========================

Datetime: 03/11/2021 06:53

===========================

   

 I/O Interventions:  Up to BR

   

===========================

Datetime: 03/11/2021 06:41

===========================

   

   

===================================

MEDICATIONS

===================================

   

 Analgesics/Sedatives:  Fentanyl (mcg) @ 50

   

===========================

Datetime: 03/11/2021 06:31

===========================

   

   

===================================

PATIENT CARE

===================================

   

 IV/Blood Work:  IV Bolus Started

   

===========================

Datetime: 03/11/2021 06:30

===========================

   

   

===================================

FETAL ASSESSMENT A

===================================

   

 Monitor Mode:  Telemetry

 Accelerations:  15X15

 Category:  Category II

   

===========================

Datetime: 03/11/2021 05:42

===========================

   

 Temperature Route:  Oral

   

===========================

Datetime: 03/11/2021 05:38

===========================

   

 Effacement (%):  100

 Vaginal Bleeding:  Normal Show

   

===========================

Datetime: 03/11/2021 04:59

===========================

   

 Medication Comments:  nitrous stopped

   

===========================

Datetime: 03/11/2021 03:40

===========================

   

   

===================================

ANESTHESIA

===================================

   

 Anesthesia Plans:  Uncertain

   

===========================

Datetime: 03/11/2021 03:00

===========================

   

 Provider Reviewed Strip:  Yes

   

===================================

TEACHING

===================================

   

 Plan of Care:  Plan of Care Discussed; Vaginal Delivery

 Labor/Induction:  Labor Stages; Fetal Interventions; Activity; Pushing Methods

 Pain Management:  IV Narcotics; Epidural; PRN Medications; Pain Scale/Goals; Comfort Measures

 Provider Notified (Name):  Dr Chu

## 2021-06-15 NOTE — PROCEDURE REPORT
- HPI


Diagnosis/Indication for NST: Other (DVT with PE.  On Lovenox)


                                                               Current Pregnancy





EDU                              21


Gestation                        35 Weeks and 2 Days


                          3


Para                             1





Vital Signs











Temperature  36.7 C   21 13:35


 


Heart Rate  93   21 13:35


 


Respiratory Rate  19   21 13:35


 


Blood Pressure  126/79   21 13:35


 


O2 Saturation  100   21 13:35




















Temperature  36.7 C   21 13:35


 


Heart Rate  93   21 13:35


 


Respiratory Rate  19   21 13:35


 


Blood Pressure  126/79   21 13:35


 


O2 Saturation  100   21 13:35














- NST Procedure


NST Procedure





Start Date                       21


Start Time                       13:32


Stop Time                        14:30


Vibroacoustic Stimulation Used   No


Patient States Fetal Movement    Yes











- Results and Plan


Findings/Impression: 


Reactive NST  with 8/8 BPP





Plan: 


Continue antinatal testing Health Maintenance Due   Topic Date Due   • Medicare Wellness Visit  03/23/2017   • Pneumococcal Vaccine 65+ (2 of 2 - PPSV23) 10/03/2019   • Depression Screening  10/06/2021       Patient is due for topics as listed above but is not proceeding with Immunization(s) Pneumococcal and MWV (Medicare Wellness Visit) at this time.

## 2023-10-03 ENCOUNTER — HOSPITAL ENCOUNTER (OUTPATIENT)
Dept: HOSPITAL 76 - LAB.N | Age: 38
Discharge: HOME | End: 2023-10-03
Attending: NURSE PRACTITIONER
Payer: COMMERCIAL

## 2023-10-03 DIAGNOSIS — Z51.81: Primary | ICD-10-CM

## 2023-10-03 DIAGNOSIS — F32.A: ICD-10-CM

## 2023-10-03 DIAGNOSIS — R53.83: ICD-10-CM

## 2023-10-03 DIAGNOSIS — Z79.899: ICD-10-CM

## 2023-10-03 DIAGNOSIS — Z13.220: ICD-10-CM

## 2023-10-03 LAB
ALBUMIN DIAFP-MCNC: 4.3 G/DL (ref 3.2–5.5)
ALBUMIN/GLOB SERPL: 1.4 {RATIO} (ref 1–2.2)
ALP SERPL-CCNC: 67 IU/L (ref 42–121)
ALT SERPL W P-5'-P-CCNC: 27 IU/L (ref 10–60)
ANION GAP SERPL CALCULATED.4IONS-SCNC: 6 MMOL/L (ref 6–13)
AST SERPL W P-5'-P-CCNC: 21 IU/L (ref 10–42)
BASOPHILS NFR BLD AUTO: 0 10^3/UL (ref 0–0.1)
BASOPHILS NFR BLD AUTO: 0.5 %
BILIRUB BLD-MCNC: 0.4 MG/DL (ref 0.2–1)
BUN SERPL-MCNC: 10 MG/DL (ref 6–20)
CALCIUM UR-MCNC: 9.2 MG/DL (ref 8.5–10.3)
CHLORIDE SERPL-SCNC: 104 MMOL/L (ref 101–111)
CHOLEST SERPL-MCNC: 254 MG/DL
CO2 SERPL-SCNC: 27 MMOL/L (ref 21–32)
CREAT SERPLBLD-SCNC: 0.8 MG/DL (ref 0.6–1.3)
EOSINOPHIL # BLD AUTO: 0.1 10^3/UL (ref 0–0.7)
EOSINOPHIL NFR BLD AUTO: 1.7 %
ERYTHROCYTE [DISTWIDTH] IN BLOOD BY AUTOMATED COUNT: 13.8 % (ref 12–15)
GFRSERPLBLD MDRD-ARVRAT: 81 ML/MIN/{1.73_M2} (ref 89–?)
GLOBULIN SER-MCNC: 3.1 G/DL (ref 2.1–4.2)
GLUCOSE SERPL-MCNC: 91 MG/DL (ref 74–104)
HCT VFR BLD AUTO: 41.4 % (ref 37–47)
HDLC SERPL-MCNC: 34 MG/DL
HDLC SERPL: 7.5 {RATIO} (ref ?–4.4)
HGB UR QL STRIP: 12.9 G/DL (ref 12–16)
LDLC SERPL CALC-MCNC: 145 MG/DL
LDLC/HDLC SERPL: 4.3 {RATIO} (ref ?–4.4)
LYMPHOCYTES # SPEC AUTO: 1.6 10^3/UL (ref 1.5–3.5)
LYMPHOCYTES NFR BLD AUTO: 26.4 %
MCH RBC QN AUTO: 25.9 PG (ref 27–31)
MCHC RBC AUTO-ENTMCNC: 31.2 G/DL (ref 32–36)
MCV RBC AUTO: 83 FL (ref 81–99)
MONOCYTES # BLD AUTO: 0.5 10^3/UL (ref 0–1)
MONOCYTES NFR BLD AUTO: 8.3 %
NEUTROPHILS # BLD AUTO: 3.7 10^3/UL (ref 1.5–6.6)
NEUTROPHILS # SNV AUTO: 5.9 X10^3/UL (ref 4.8–10.8)
NEUTROPHILS NFR BLD AUTO: 62.8 %
NRBC # BLD AUTO: 0 /100WBC
NRBC # BLD AUTO: 0 X10^3/UL
PDW BLD AUTO: 10.6 FL (ref 7.9–10.8)
PLATELET # BLD: 226 10^3/UL (ref 130–450)
POTASSIUM SERPL-SCNC: 4.2 MMOL/L (ref 3.5–4.5)
PROT SPEC-MCNC: 7.4 G/DL (ref 6.4–8.9)
RBC MAR: 4.99 10^6/UL (ref 4.2–5.4)
SODIUM SERPLBLD-SCNC: 137 MMOL/L (ref 135–145)
TRIGL P FAST SERPL-MCNC: 374 MG/DL (ref 48–352)
TSH SERPL-ACNC: 1.49 UIU/ML (ref 0.34–5.6)
VLDLC SERPL-SCNC: 75 MG/DL

## 2023-10-03 PROCEDURE — 83721 ASSAY OF BLOOD LIPOPROTEIN: CPT

## 2023-10-03 PROCEDURE — 84443 ASSAY THYROID STIM HORMONE: CPT

## 2023-10-03 PROCEDURE — 80061 LIPID PANEL: CPT

## 2023-10-03 PROCEDURE — 80053 COMPREHEN METABOLIC PANEL: CPT

## 2023-10-03 PROCEDURE — 85025 COMPLETE CBC W/AUTO DIFF WBC: CPT

## 2023-10-03 PROCEDURE — 36415 COLL VENOUS BLD VENIPUNCTURE: CPT
